# Patient Record
Sex: MALE | Race: WHITE | NOT HISPANIC OR LATINO | Employment: OTHER | URBAN - METROPOLITAN AREA
[De-identification: names, ages, dates, MRNs, and addresses within clinical notes are randomized per-mention and may not be internally consistent; named-entity substitution may affect disease eponyms.]

---

## 2017-01-09 ENCOUNTER — OFFICE VISIT - HEALTHEAST (OUTPATIENT)
Dept: FAMILY MEDICINE | Facility: CLINIC | Age: 82
End: 2017-01-09

## 2017-01-09 DIAGNOSIS — B07.9 WART: ICD-10-CM

## 2017-01-09 DIAGNOSIS — L57.0 ACTINIC KERATOSIS: ICD-10-CM

## 2017-01-09 DIAGNOSIS — M54.12 CERVICAL RADICULOPATHY: ICD-10-CM

## 2017-01-25 ENCOUNTER — RECORDS - HEALTHEAST (OUTPATIENT)
Dept: ADMINISTRATIVE | Facility: OTHER | Age: 82
End: 2017-01-25

## 2017-02-03 ENCOUNTER — COMMUNICATION - HEALTHEAST (OUTPATIENT)
Dept: SCHEDULING | Facility: CLINIC | Age: 82
End: 2017-02-03

## 2017-02-03 ENCOUNTER — OFFICE VISIT - HEALTHEAST (OUTPATIENT)
Dept: FAMILY MEDICINE | Facility: CLINIC | Age: 82
End: 2017-02-03

## 2017-02-03 ENCOUNTER — COMMUNICATION - HEALTHEAST (OUTPATIENT)
Dept: FAMILY MEDICINE | Facility: CLINIC | Age: 82
End: 2017-02-03

## 2017-02-03 ENCOUNTER — RECORDS - HEALTHEAST (OUTPATIENT)
Dept: GENERAL RADIOLOGY | Facility: CLINIC | Age: 82
End: 2017-02-03

## 2017-02-03 DIAGNOSIS — I10 HTN (HYPERTENSION): ICD-10-CM

## 2017-02-03 DIAGNOSIS — E53.8 OTHER B-COMPLEX DEFICIENCIES: ICD-10-CM

## 2017-02-03 DIAGNOSIS — E78.5 DYSLIPIDEMIA: ICD-10-CM

## 2017-02-03 DIAGNOSIS — E55.9 VITAMIN D DEFICIENCY: ICD-10-CM

## 2017-02-03 DIAGNOSIS — M54.12 RADICULOPATHY, CERVICAL REGION: ICD-10-CM

## 2017-02-03 DIAGNOSIS — H26.9 CATARACT: ICD-10-CM

## 2017-02-03 DIAGNOSIS — M54.12 CERVICAL RADICULOPATHY: ICD-10-CM

## 2017-02-03 DIAGNOSIS — I10 ESSENTIAL HYPERTENSION WITH GOAL BLOOD PRESSURE LESS THAN 140/90: ICD-10-CM

## 2017-02-03 DIAGNOSIS — I10 HYPERTENSION: ICD-10-CM

## 2017-02-03 DIAGNOSIS — N40.1 BENIGN NON-NODULAR PROSTATIC HYPERPLASIA WITH LOWER URINARY TRACT SYMPTOMS: ICD-10-CM

## 2017-02-03 DIAGNOSIS — Z12.5 SCREENING FOR PROSTATE CANCER: ICD-10-CM

## 2017-02-03 LAB
ATRIAL RATE - MUSE: 63 BPM
DIASTOLIC BLOOD PRESSURE - MUSE: NORMAL MMHG
INTERPRETATION ECG - MUSE: NORMAL
P AXIS - MUSE: 49 DEGREES
PR INTERVAL - MUSE: 160 MS
PSA SERPL-MCNC: 0.6 NG/ML (ref 0–6.5)
QRS DURATION - MUSE: 92 MS
QT - MUSE: 428 MS
QTC - MUSE: 437 MS
R AXIS - MUSE: -20 DEGREES
SYSTOLIC BLOOD PRESSURE - MUSE: NORMAL MMHG
T AXIS - MUSE: 39 DEGREES
VENTRICULAR RATE- MUSE: 63 BPM

## 2017-02-03 ASSESSMENT — MIFFLIN-ST. JEOR: SCORE: 1627.15

## 2017-02-06 ENCOUNTER — COMMUNICATION - HEALTHEAST (OUTPATIENT)
Dept: FAMILY MEDICINE | Facility: CLINIC | Age: 82
End: 2017-02-06

## 2017-02-07 ENCOUNTER — COMMUNICATION - HEALTHEAST (OUTPATIENT)
Dept: FAMILY MEDICINE | Facility: CLINIC | Age: 82
End: 2017-02-07

## 2017-02-15 ENCOUNTER — COMMUNICATION - HEALTHEAST (OUTPATIENT)
Dept: FAMILY MEDICINE | Facility: CLINIC | Age: 82
End: 2017-02-15

## 2017-03-03 ENCOUNTER — AMBULATORY - HEALTHEAST (OUTPATIENT)
Dept: FAMILY MEDICINE | Facility: CLINIC | Age: 82
End: 2017-03-03

## 2017-03-03 DIAGNOSIS — M54.12 CERVICAL RADICULOPATHY: ICD-10-CM

## 2017-03-06 ENCOUNTER — COMMUNICATION - HEALTHEAST (OUTPATIENT)
Dept: FAMILY MEDICINE | Facility: CLINIC | Age: 82
End: 2017-03-06

## 2017-03-08 ENCOUNTER — HOSPITAL ENCOUNTER (OUTPATIENT)
Dept: MRI IMAGING | Facility: HOSPITAL | Age: 82
Discharge: HOME | End: 2017-03-08
Attending: FAMILY MEDICINE

## 2017-03-08 DIAGNOSIS — M54.12 CERVICAL RADICULOPATHY: ICD-10-CM

## 2017-03-11 ENCOUNTER — COMMUNICATION - HEALTHEAST (OUTPATIENT)
Dept: FAMILY MEDICINE | Facility: CLINIC | Age: 82
End: 2017-03-11

## 2017-03-29 ENCOUNTER — COMMUNICATION - HEALTHEAST (OUTPATIENT)
Dept: FAMILY MEDICINE | Facility: CLINIC | Age: 82
End: 2017-03-29

## 2017-03-29 DIAGNOSIS — K21.9 GERD (GASTROESOPHAGEAL REFLUX DISEASE): ICD-10-CM

## 2017-04-05 ENCOUNTER — COMMUNICATION - HEALTHEAST (OUTPATIENT)
Dept: FAMILY MEDICINE | Facility: CLINIC | Age: 82
End: 2017-04-05

## 2017-04-05 DIAGNOSIS — K21.9 GERD (GASTROESOPHAGEAL REFLUX DISEASE): ICD-10-CM

## 2017-04-19 ENCOUNTER — COMMUNICATION - HEALTHEAST (OUTPATIENT)
Dept: FAMILY MEDICINE | Facility: CLINIC | Age: 82
End: 2017-04-19

## 2017-04-19 ENCOUNTER — AMBULATORY - HEALTHEAST (OUTPATIENT)
Dept: FAMILY MEDICINE | Facility: CLINIC | Age: 82
End: 2017-04-19

## 2017-04-19 DIAGNOSIS — K21.9 GERD (GASTROESOPHAGEAL REFLUX DISEASE): ICD-10-CM

## 2017-05-03 ENCOUNTER — COMMUNICATION - HEALTHEAST (OUTPATIENT)
Dept: FAMILY MEDICINE | Facility: CLINIC | Age: 82
End: 2017-05-03

## 2017-05-03 ENCOUNTER — AMBULATORY - HEALTHEAST (OUTPATIENT)
Dept: FAMILY MEDICINE | Facility: CLINIC | Age: 82
End: 2017-05-03

## 2017-05-03 DIAGNOSIS — M54.2 NECK PAIN: ICD-10-CM

## 2017-05-03 DIAGNOSIS — M25.519 SHOULDER PAIN: ICD-10-CM

## 2017-05-15 ENCOUNTER — HOSPITAL ENCOUNTER (OUTPATIENT)
Dept: PHYSICAL MEDICINE AND REHAB | Facility: CLINIC | Age: 82
Discharge: HOME | End: 2017-05-15
Attending: FAMILY MEDICINE

## 2017-05-15 ENCOUNTER — COMMUNICATION - HEALTHEAST (OUTPATIENT)
Dept: PHYSICAL MEDICINE AND REHAB | Facility: CLINIC | Age: 82
End: 2017-05-15

## 2017-05-15 DIAGNOSIS — S32.010A COMPRESSION FRACTURE OF L1 LUMBAR VERTEBRA (H): ICD-10-CM

## 2017-05-15 DIAGNOSIS — M79.18 MYOFASCIAL MUSCLE PAIN: ICD-10-CM

## 2017-05-15 DIAGNOSIS — M48.02 NEURAL FORAMINAL STENOSIS OF CERVICAL SPINE: ICD-10-CM

## 2017-05-15 DIAGNOSIS — M54.12 CERVICAL RADICULAR PAIN: ICD-10-CM

## 2017-05-15 ASSESSMENT — MIFFLIN-ST. JEOR: SCORE: 1618.08

## 2017-05-23 ENCOUNTER — RECORDS - HEALTHEAST (OUTPATIENT)
Dept: ADMINISTRATIVE | Facility: OTHER | Age: 82
End: 2017-05-23

## 2017-09-05 ENCOUNTER — RECORDS - HEALTHEAST (OUTPATIENT)
Dept: GENERAL RADIOLOGY | Facility: CLINIC | Age: 82
End: 2017-09-05

## 2017-09-05 ENCOUNTER — OFFICE VISIT - HEALTHEAST (OUTPATIENT)
Dept: FAMILY MEDICINE | Facility: CLINIC | Age: 82
End: 2017-09-05

## 2017-09-05 DIAGNOSIS — R10.9 ABDOMINAL PRESSURE: ICD-10-CM

## 2017-09-05 DIAGNOSIS — R10.9 UNSPECIFIED ABDOMINAL PAIN: ICD-10-CM

## 2017-09-05 DIAGNOSIS — D48.5 NEOPLASM OF UNCERTAIN BEHAVIOR OF SKIN: ICD-10-CM

## 2017-09-05 DIAGNOSIS — R14.0 ABDOMINAL DISTENSION (GASEOUS): ICD-10-CM

## 2017-09-05 DIAGNOSIS — R14.0 FLATULENCE/GAS PAIN/BELCHING: ICD-10-CM

## 2017-09-06 ENCOUNTER — HOSPITAL ENCOUNTER (OUTPATIENT)
Dept: CT IMAGING | Facility: HOSPITAL | Age: 82
Discharge: HOME | End: 2017-09-06
Attending: FAMILY MEDICINE

## 2017-09-06 DIAGNOSIS — R10.9 ABDOMINAL PRESSURE: ICD-10-CM

## 2017-09-06 DIAGNOSIS — R14.0 FLATULENCE/GAS PAIN/BELCHING: ICD-10-CM

## 2017-09-07 ENCOUNTER — COMMUNICATION - HEALTHEAST (OUTPATIENT)
Dept: FAMILY MEDICINE | Facility: CLINIC | Age: 82
End: 2017-09-07

## 2017-09-08 ENCOUNTER — COMMUNICATION - HEALTHEAST (OUTPATIENT)
Dept: FAMILY MEDICINE | Facility: CLINIC | Age: 82
End: 2017-09-08

## 2017-09-08 DIAGNOSIS — I10 HYPERTENSION: ICD-10-CM

## 2017-09-11 LAB
ATRIAL RATE - MUSE: 52 BPM
DIASTOLIC BLOOD PRESSURE - MUSE: NORMAL MMHG
INTERPRETATION ECG - MUSE: NORMAL
P AXIS - MUSE: 47 DEGREES
PR INTERVAL - MUSE: 148 MS
QRS DURATION - MUSE: 88 MS
QT - MUSE: 456 MS
QTC - MUSE: 424 MS
R AXIS - MUSE: -16 DEGREES
SYSTOLIC BLOOD PRESSURE - MUSE: NORMAL MMHG
T AXIS - MUSE: 37 DEGREES
VENTRICULAR RATE- MUSE: 52 BPM

## 2017-09-15 ENCOUNTER — COMMUNICATION - HEALTHEAST (OUTPATIENT)
Dept: FAMILY MEDICINE | Facility: CLINIC | Age: 82
End: 2017-09-15

## 2017-10-02 ENCOUNTER — RECORDS - HEALTHEAST (OUTPATIENT)
Dept: ADMINISTRATIVE | Facility: OTHER | Age: 82
End: 2017-10-02

## 2017-10-16 ENCOUNTER — COMMUNICATION - HEALTHEAST (OUTPATIENT)
Dept: FAMILY MEDICINE | Facility: CLINIC | Age: 82
End: 2017-10-16

## 2017-10-23 ENCOUNTER — RECORDS - HEALTHEAST (OUTPATIENT)
Dept: ADMINISTRATIVE | Facility: OTHER | Age: 82
End: 2017-10-23

## 2017-10-26 ENCOUNTER — RECORDS - HEALTHEAST (OUTPATIENT)
Dept: ADMINISTRATIVE | Facility: OTHER | Age: 82
End: 2017-10-26

## 2017-12-15 ENCOUNTER — AMBULATORY - HEALTHEAST (OUTPATIENT)
Dept: NURSING | Facility: CLINIC | Age: 82
End: 2017-12-15

## 2017-12-15 DIAGNOSIS — Z00.00 PREVENTATIVE HEALTH CARE: ICD-10-CM

## 2018-02-01 ENCOUNTER — COMMUNICATION - HEALTHEAST (OUTPATIENT)
Dept: FAMILY MEDICINE | Facility: CLINIC | Age: 83
End: 2018-02-01

## 2018-02-01 DIAGNOSIS — I10 HTN (HYPERTENSION): ICD-10-CM

## 2018-02-01 DIAGNOSIS — N40.0 BPH (BENIGN PROSTATIC HYPERPLASIA): ICD-10-CM

## 2018-02-06 ENCOUNTER — OFFICE VISIT - HEALTHEAST (OUTPATIENT)
Dept: FAMILY MEDICINE | Facility: CLINIC | Age: 83
End: 2018-02-06

## 2018-02-06 DIAGNOSIS — E78.5 DYSLIPIDEMIA: ICD-10-CM

## 2018-02-06 DIAGNOSIS — K21.9 GERD (GASTROESOPHAGEAL REFLUX DISEASE): ICD-10-CM

## 2018-02-06 DIAGNOSIS — Z12.5 SCREENING FOR PROSTATE CANCER: ICD-10-CM

## 2018-02-06 DIAGNOSIS — K14.3 COATED TONGUE: ICD-10-CM

## 2018-02-06 DIAGNOSIS — E53.8 VITAMIN B12 DEFICIENCY: ICD-10-CM

## 2018-02-06 DIAGNOSIS — I10 ESSENTIAL HYPERTENSION: ICD-10-CM

## 2018-02-06 DIAGNOSIS — E55.9 VITAMIN D DEFICIENCY: ICD-10-CM

## 2018-02-06 DIAGNOSIS — Z00.00 WELL ADULT EXAM: ICD-10-CM

## 2018-02-06 DIAGNOSIS — N40.0 BPH (BENIGN PROSTATIC HYPERPLASIA): ICD-10-CM

## 2018-02-06 DIAGNOSIS — R79.89 OTHER SPECIFIED ABNORMAL FINDINGS OF BLOOD CHEMISTRY: ICD-10-CM

## 2018-02-06 DIAGNOSIS — R14.0 BLOATING: ICD-10-CM

## 2018-02-06 LAB
ALBUMIN SERPL-MCNC: 4.1 G/DL (ref 3.5–5)
ALBUMIN UR-MCNC: NEGATIVE MG/DL
ALP SERPL-CCNC: 79 U/L (ref 45–120)
ALT SERPL W P-5'-P-CCNC: 64 U/L (ref 0–45)
ANION GAP SERPL CALCULATED.3IONS-SCNC: 11 MMOL/L (ref 5–18)
APPEARANCE UR: CLEAR
AST SERPL W P-5'-P-CCNC: 71 U/L (ref 0–40)
BASOPHILS # BLD AUTO: 0.1 THOU/UL (ref 0–0.2)
BASOPHILS NFR BLD AUTO: 1 % (ref 0–2)
BILIRUB SERPL-MCNC: 1.5 MG/DL (ref 0–1)
BILIRUB UR QL STRIP: NEGATIVE
BUN SERPL-MCNC: 16 MG/DL (ref 8–28)
CALCIUM SERPL-MCNC: 9.3 MG/DL (ref 8.5–10.5)
CHLORIDE BLD-SCNC: 104 MMOL/L (ref 98–107)
CHOLEST SERPL-MCNC: 213 MG/DL
CO2 SERPL-SCNC: 25 MMOL/L (ref 22–31)
COLOR UR AUTO: YELLOW
CREAT SERPL-MCNC: 1.06 MG/DL (ref 0.7–1.3)
CRP SERPL HS-MCNC: 5.3 MG/L (ref 0–3)
EOSINOPHIL # BLD AUTO: 0.2 THOU/UL (ref 0–0.4)
EOSINOPHIL NFR BLD AUTO: 3 % (ref 0–6)
ERYTHROCYTE [DISTWIDTH] IN BLOOD BY AUTOMATED COUNT: 12.3 % (ref 11–14.5)
FASTING STATUS PATIENT QL REPORTED: YES
GFR SERPL CREATININE-BSD FRML MDRD: >60 ML/MIN/1.73M2
GLUCOSE BLD-MCNC: 115 MG/DL (ref 70–125)
GLUCOSE UR STRIP-MCNC: NEGATIVE MG/DL
HCT VFR BLD AUTO: 51.3 % (ref 40–54)
HDLC SERPL-MCNC: 50 MG/DL
HGB BLD-MCNC: 17.2 G/DL (ref 14–18)
HGB UR QL STRIP: NEGATIVE
KETONES UR STRIP-MCNC: NEGATIVE MG/DL
LDLC SERPL CALC-MCNC: 138 MG/DL
LEUKOCYTE ESTERASE UR QL STRIP: NEGATIVE
LYMPHOCYTES # BLD AUTO: 1.4 THOU/UL (ref 0.8–4.4)
LYMPHOCYTES NFR BLD AUTO: 17 % (ref 20–40)
MCH RBC QN AUTO: 30.5 PG (ref 27–34)
MCHC RBC AUTO-ENTMCNC: 33.5 G/DL (ref 32–36)
MCV RBC AUTO: 91 FL (ref 80–100)
MONOCYTES # BLD AUTO: 0.6 THOU/UL (ref 0–0.9)
MONOCYTES NFR BLD AUTO: 8 % (ref 2–10)
NEUTROPHILS # BLD AUTO: 5.6 THOU/UL (ref 2–7.7)
NEUTROPHILS NFR BLD AUTO: 71 % (ref 50–70)
NITRATE UR QL: NEGATIVE
PH UR STRIP: 6.5 [PH] (ref 5–8)
PLATELET # BLD AUTO: 200 THOU/UL (ref 140–440)
PMV BLD AUTO: 10.9 FL (ref 8.5–12.5)
POTASSIUM BLD-SCNC: 4.4 MMOL/L (ref 3.5–5)
PROT SERPL-MCNC: 7.4 G/DL (ref 6–8)
PSA SERPL-MCNC: 0.5 NG/ML (ref 0–6.5)
RBC # BLD AUTO: 5.64 MILL/UL (ref 4.4–6.2)
SODIUM SERPL-SCNC: 140 MMOL/L (ref 136–145)
SP GR UR STRIP: 1.01 (ref 1–1.03)
TRIGL SERPL-MCNC: 126 MG/DL
TSH SERPL DL<=0.005 MIU/L-ACNC: 2.34 UIU/ML (ref 0.3–5)
UROBILINOGEN UR STRIP-ACNC: NORMAL
VIT B12 SERPL-MCNC: 521 PG/ML (ref 213–816)
WBC: 7.9 THOU/UL (ref 4–11)

## 2018-02-06 RX ORDER — DUTASTERIDE 0.5 MG/1
CAPSULE, LIQUID FILLED ORAL
Qty: 90 CAPSULE | Refills: 3 | Status: SHIPPED | OUTPATIENT
Start: 2018-02-06

## 2018-02-06 ASSESSMENT — MIFFLIN-ST. JEOR: SCORE: 1633.96

## 2018-02-07 LAB — 25(OH)D3 SERPL-MCNC: 17.6 NG/ML (ref 30–80)

## 2018-02-12 LAB — BACTERIA SPEC CULT: NORMAL

## 2018-02-14 ENCOUNTER — COMMUNICATION - HEALTHEAST (OUTPATIENT)
Dept: FAMILY MEDICINE | Facility: CLINIC | Age: 83
End: 2018-02-14

## 2018-02-27 ENCOUNTER — COMMUNICATION - HEALTHEAST (OUTPATIENT)
Dept: FAMILY MEDICINE | Facility: CLINIC | Age: 83
End: 2018-02-27

## 2018-03-08 ENCOUNTER — AMBULATORY - HEALTHEAST (OUTPATIENT)
Dept: FAMILY MEDICINE | Facility: CLINIC | Age: 83
End: 2018-03-08

## 2018-03-08 ENCOUNTER — COMMUNICATION - HEALTHEAST (OUTPATIENT)
Dept: FAMILY MEDICINE | Facility: CLINIC | Age: 83
End: 2018-03-08

## 2018-03-08 DIAGNOSIS — J98.6 ELEVATED DIAPHRAGM: ICD-10-CM

## 2018-03-08 DIAGNOSIS — R10.13 EPIGASTRIC PAIN: ICD-10-CM

## 2018-03-16 ENCOUNTER — COMMUNICATION - HEALTHEAST (OUTPATIENT)
Dept: FAMILY MEDICINE | Facility: CLINIC | Age: 83
End: 2018-03-16

## 2018-03-16 DIAGNOSIS — K21.9 GERD (GASTROESOPHAGEAL REFLUX DISEASE): ICD-10-CM

## 2018-04-25 ENCOUNTER — COMMUNICATION - HEALTHEAST (OUTPATIENT)
Dept: FAMILY MEDICINE | Facility: CLINIC | Age: 83
End: 2018-04-25

## 2018-04-25 DIAGNOSIS — D12.6 BENIGN NEOPLASM OF COLON: ICD-10-CM

## 2018-05-16 ENCOUNTER — RECORDS - HEALTHEAST (OUTPATIENT)
Dept: ADMINISTRATIVE | Facility: OTHER | Age: 83
End: 2018-05-16

## 2018-05-31 ENCOUNTER — COMMUNICATION - HEALTHEAST (OUTPATIENT)
Dept: SCHEDULING | Facility: CLINIC | Age: 83
End: 2018-05-31

## 2018-09-07 ENCOUNTER — COMMUNICATION - HEALTHEAST (OUTPATIENT)
Dept: FAMILY MEDICINE | Facility: CLINIC | Age: 83
End: 2018-09-07

## 2018-09-07 DIAGNOSIS — I10 HYPERTENSION: ICD-10-CM

## 2018-09-20 ENCOUNTER — RECORDS - HEALTHEAST (OUTPATIENT)
Dept: ADMINISTRATIVE | Facility: OTHER | Age: 83
End: 2018-09-20

## 2018-11-13 ENCOUNTER — OFFICE VISIT - HEALTHEAST (OUTPATIENT)
Dept: FAMILY MEDICINE | Facility: CLINIC | Age: 83
End: 2018-11-13

## 2018-11-13 DIAGNOSIS — R14.3 FLATULENCE, ERUCTATION AND GAS PAIN: ICD-10-CM

## 2018-11-13 DIAGNOSIS — R19.4 BOWEL HABIT CHANGES: ICD-10-CM

## 2018-11-13 DIAGNOSIS — R14.1 FLATULENCE, ERUCTATION AND GAS PAIN: ICD-10-CM

## 2018-11-13 DIAGNOSIS — R14.2 FLATULENCE, ERUCTATION AND GAS PAIN: ICD-10-CM

## 2018-11-13 DIAGNOSIS — K21.9 GASTROESOPHAGEAL REFLUX DISEASE WITHOUT ESOPHAGITIS: ICD-10-CM

## 2018-11-13 DIAGNOSIS — R14.0 BLOATING SYMPTOM: ICD-10-CM

## 2018-12-06 ENCOUNTER — COMMUNICATION - HEALTHEAST (OUTPATIENT)
Dept: FAMILY MEDICINE | Facility: CLINIC | Age: 83
End: 2018-12-06

## 2018-12-06 DIAGNOSIS — I10 HYPERTENSION: ICD-10-CM

## 2019-03-04 ENCOUNTER — AMBULATORY - HEALTHEAST (OUTPATIENT)
Dept: LAB | Facility: CLINIC | Age: 84
End: 2019-03-04

## 2019-03-04 DIAGNOSIS — Z12.5 ENCOUNTER FOR SCREENING FOR MALIGNANT NEOPLASM OF PROSTATE: ICD-10-CM

## 2019-03-25 ENCOUNTER — AMBULATORY - HEALTHEAST (OUTPATIENT)
Dept: LAB | Facility: CLINIC | Age: 84
End: 2019-03-25

## 2019-03-25 DIAGNOSIS — Z12.5 ENCOUNTER FOR SCREENING FOR MALIGNANT NEOPLASM OF PROSTATE: ICD-10-CM

## 2019-04-04 ENCOUNTER — OFFICE VISIT - HEALTHEAST (OUTPATIENT)
Dept: FAMILY MEDICINE | Facility: CLINIC | Age: 84
End: 2019-04-04

## 2019-04-04 DIAGNOSIS — L57.0 ACTINIC KERATOSIS: ICD-10-CM

## 2019-04-04 DIAGNOSIS — K21.9 GASTROESOPHAGEAL REFLUX DISEASE WITHOUT ESOPHAGITIS: ICD-10-CM

## 2019-04-04 DIAGNOSIS — E53.8 VITAMIN B12 DEFICIENCY: ICD-10-CM

## 2019-04-04 DIAGNOSIS — Z00.00 WELL ADULT EXAM: ICD-10-CM

## 2019-04-04 DIAGNOSIS — R79.89 OTHER SPECIFIED ABNORMAL FINDINGS OF BLOOD CHEMISTRY: ICD-10-CM

## 2019-04-04 DIAGNOSIS — N40.1 BENIGN NON-NODULAR PROSTATIC HYPERPLASIA WITH LOWER URINARY TRACT SYMPTOMS: ICD-10-CM

## 2019-04-04 DIAGNOSIS — E55.9 VITAMIN D DEFICIENCY: ICD-10-CM

## 2019-04-04 DIAGNOSIS — H53.8 BLURRY VISION, LEFT EYE: ICD-10-CM

## 2019-04-04 LAB
ALBUMIN SERPL-MCNC: 4 G/DL (ref 3.5–5)
ALP SERPL-CCNC: 64 U/L (ref 45–120)
ALT SERPL W P-5'-P-CCNC: 56 U/L (ref 0–45)
ANION GAP SERPL CALCULATED.3IONS-SCNC: 11 MMOL/L (ref 5–18)
AST SERPL W P-5'-P-CCNC: 66 U/L (ref 0–40)
BASOPHILS # BLD AUTO: 0.1 THOU/UL (ref 0–0.2)
BASOPHILS NFR BLD AUTO: 1 % (ref 0–2)
BILIRUB SERPL-MCNC: 1.3 MG/DL (ref 0–1)
BUN SERPL-MCNC: 17 MG/DL (ref 8–28)
CALCIUM SERPL-MCNC: 9.5 MG/DL (ref 8.5–10.5)
CHLORIDE BLD-SCNC: 104 MMOL/L (ref 98–107)
CHOLEST SERPL-MCNC: 201 MG/DL
CO2 SERPL-SCNC: 25 MMOL/L (ref 22–31)
CREAT SERPL-MCNC: 1.02 MG/DL (ref 0.7–1.3)
EOSINOPHIL # BLD AUTO: 0.3 THOU/UL (ref 0–0.4)
EOSINOPHIL NFR BLD AUTO: 4 % (ref 0–6)
ERYTHROCYTE [DISTWIDTH] IN BLOOD BY AUTOMATED COUNT: 11.8 % (ref 11–14.5)
FASTING STATUS PATIENT QL REPORTED: ABNORMAL
GFR SERPL CREATININE-BSD FRML MDRD: >60 ML/MIN/1.73M2
GLUCOSE BLD-MCNC: 105 MG/DL (ref 70–125)
HCT VFR BLD AUTO: 49 % (ref 40–54)
HDLC SERPL-MCNC: 51 MG/DL
HGB BLD-MCNC: 16.5 G/DL (ref 14–18)
LDLC SERPL CALC-MCNC: 126 MG/DL
LYMPHOCYTES # BLD AUTO: 1.4 THOU/UL (ref 0.8–4.4)
LYMPHOCYTES NFR BLD AUTO: 21 % (ref 20–40)
MCH RBC QN AUTO: 30.3 PG (ref 27–34)
MCHC RBC AUTO-ENTMCNC: 33.7 G/DL (ref 32–36)
MCV RBC AUTO: 90 FL (ref 80–100)
MONOCYTES # BLD AUTO: 0.4 THOU/UL (ref 0–0.9)
MONOCYTES NFR BLD AUTO: 6 % (ref 2–10)
NEUTROPHILS # BLD AUTO: 4.5 THOU/UL (ref 2–7.7)
NEUTROPHILS NFR BLD AUTO: 69 % (ref 50–70)
PLATELET # BLD AUTO: 167 THOU/UL (ref 140–440)
PMV BLD AUTO: 7.8 FL (ref 7–10)
POTASSIUM BLD-SCNC: 4.1 MMOL/L (ref 3.5–5)
PROT SERPL-MCNC: 7.1 G/DL (ref 6–8)
RBC # BLD AUTO: 5.44 MILL/UL (ref 4.4–6.2)
SODIUM SERPL-SCNC: 140 MMOL/L (ref 136–145)
TRIGL SERPL-MCNC: 118 MG/DL
TSH SERPL DL<=0.005 MIU/L-ACNC: 1.48 UIU/ML (ref 0.3–5)
VIT B12 SERPL-MCNC: 388 PG/ML (ref 213–816)
WBC: 6.6 THOU/UL (ref 4–11)

## 2019-04-04 ASSESSMENT — MIFFLIN-ST. JEOR: SCORE: 1608.44

## 2019-04-10 LAB
MAGNESIUM,RBC - HISTORICAL: 4.7 MG/DL (ref 3.5–7.1)
SPECIMEN STATUS: NORMAL

## 2019-04-12 ENCOUNTER — COMMUNICATION - HEALTHEAST (OUTPATIENT)
Dept: FAMILY MEDICINE | Facility: CLINIC | Age: 84
End: 2019-04-12

## 2019-04-22 ENCOUNTER — RECORDS - HEALTHEAST (OUTPATIENT)
Dept: ADMINISTRATIVE | Facility: OTHER | Age: 84
End: 2019-04-22

## 2019-04-22 ENCOUNTER — AMBULATORY - HEALTHEAST (OUTPATIENT)
Dept: LAB | Facility: CLINIC | Age: 84
End: 2019-04-22

## 2019-04-22 DIAGNOSIS — Z12.5 ENCOUNTER FOR SCREENING FOR MALIGNANT NEOPLASM OF PROSTATE: ICD-10-CM

## 2019-04-22 LAB — PSA SERPL-MCNC: 0.5 NG/ML (ref 0–6.5)

## 2019-05-14 ENCOUNTER — COMMUNICATION - HEALTHEAST (OUTPATIENT)
Dept: FAMILY MEDICINE | Facility: CLINIC | Age: 84
End: 2019-05-14

## 2019-05-21 ENCOUNTER — COMMUNICATION - HEALTHEAST (OUTPATIENT)
Dept: FAMILY MEDICINE | Facility: CLINIC | Age: 84
End: 2019-05-21

## 2019-05-21 DIAGNOSIS — K21.9 GERD (GASTROESOPHAGEAL REFLUX DISEASE): ICD-10-CM

## 2019-08-02 ENCOUNTER — RECORDS - HEALTHEAST (OUTPATIENT)
Dept: ADMINISTRATIVE | Facility: OTHER | Age: 84
End: 2019-08-02

## 2019-09-05 ENCOUNTER — RECORDS - HEALTHEAST (OUTPATIENT)
Dept: ADMINISTRATIVE | Facility: OTHER | Age: 84
End: 2019-09-05

## 2019-09-20 ENCOUNTER — RECORDS - HEALTHEAST (OUTPATIENT)
Dept: ADMINISTRATIVE | Facility: OTHER | Age: 84
End: 2019-09-20

## 2019-10-14 ENCOUNTER — RECORDS - HEALTHEAST (OUTPATIENT)
Dept: ADMINISTRATIVE | Facility: OTHER | Age: 84
End: 2019-10-14

## 2019-11-30 ENCOUNTER — COMMUNICATION - HEALTHEAST (OUTPATIENT)
Dept: FAMILY MEDICINE | Facility: CLINIC | Age: 84
End: 2019-11-30

## 2019-11-30 DIAGNOSIS — I10 HYPERTENSION: ICD-10-CM

## 2019-12-05 ENCOUNTER — RECORDS - HEALTHEAST (OUTPATIENT)
Dept: ADMINISTRATIVE | Facility: OTHER | Age: 84
End: 2019-12-05

## 2020-02-27 ENCOUNTER — COMMUNICATION - HEALTHEAST (OUTPATIENT)
Dept: FAMILY MEDICINE | Facility: CLINIC | Age: 85
End: 2020-02-27

## 2020-02-27 DIAGNOSIS — I10 HYPERTENSION: ICD-10-CM

## 2020-05-28 ENCOUNTER — COMMUNICATION - HEALTHEAST (OUTPATIENT)
Dept: FAMILY MEDICINE | Facility: CLINIC | Age: 85
End: 2020-05-28

## 2020-05-28 DIAGNOSIS — I10 HYPERTENSION: ICD-10-CM

## 2020-07-20 ENCOUNTER — OFFICE VISIT - HEALTHEAST (OUTPATIENT)
Dept: FAMILY MEDICINE | Facility: CLINIC | Age: 85
End: 2020-07-20

## 2020-07-20 DIAGNOSIS — E55.9 VITAMIN D DEFICIENCY: ICD-10-CM

## 2020-07-20 DIAGNOSIS — K21.9 GERD (GASTROESOPHAGEAL REFLUX DISEASE): ICD-10-CM

## 2020-07-20 DIAGNOSIS — B07.8 OTHER VIRAL WARTS: ICD-10-CM

## 2020-07-20 DIAGNOSIS — N40.1 BENIGN NON-NODULAR PROSTATIC HYPERPLASIA WITH LOWER URINARY TRACT SYMPTOMS: ICD-10-CM

## 2020-07-20 DIAGNOSIS — I10 HYPERTENSION: ICD-10-CM

## 2020-07-20 DIAGNOSIS — I10 ESSENTIAL HYPERTENSION: ICD-10-CM

## 2020-07-20 DIAGNOSIS — E53.8 VITAMIN B12 DEFICIENCY: ICD-10-CM

## 2020-07-20 DIAGNOSIS — Z12.5 SCREENING FOR PROSTATE CANCER: ICD-10-CM

## 2020-07-20 DIAGNOSIS — E78.5 DYSLIPIDEMIA: ICD-10-CM

## 2020-07-20 LAB
ALBUMIN SERPL-MCNC: 4.2 G/DL (ref 3.5–5)
ALBUMIN UR-MCNC: NEGATIVE MG/DL
ALP SERPL-CCNC: 71 U/L (ref 45–120)
ALT SERPL W P-5'-P-CCNC: 46 U/L (ref 0–45)
ANION GAP SERPL CALCULATED.3IONS-SCNC: 12 MMOL/L (ref 5–18)
APPEARANCE UR: CLEAR
AST SERPL W P-5'-P-CCNC: 56 U/L (ref 0–40)
BACTERIA #/AREA URNS HPF: ABNORMAL HPF
BASOPHILS # BLD AUTO: 0 THOU/UL (ref 0–0.2)
BASOPHILS NFR BLD AUTO: 1 % (ref 0–2)
BILIRUB SERPL-MCNC: 1.3 MG/DL (ref 0–1)
BILIRUB UR QL STRIP: NEGATIVE
BUN SERPL-MCNC: 15 MG/DL (ref 8–28)
CALCIUM SERPL-MCNC: 9.3 MG/DL (ref 8.5–10.5)
CHLORIDE BLD-SCNC: 104 MMOL/L (ref 98–107)
CHOLEST SERPL-MCNC: 213 MG/DL
CO2 SERPL-SCNC: 23 MMOL/L (ref 22–31)
COLOR UR AUTO: YELLOW
CREAT SERPL-MCNC: 1.02 MG/DL (ref 0.7–1.3)
EOSINOPHIL # BLD AUTO: 0.4 THOU/UL (ref 0–0.4)
EOSINOPHIL NFR BLD AUTO: 5 % (ref 0–6)
ERYTHROCYTE [DISTWIDTH] IN BLOOD BY AUTOMATED COUNT: 11.9 % (ref 11–14.5)
FASTING STATUS PATIENT QL REPORTED: YES
GFR SERPL CREATININE-BSD FRML MDRD: >60 ML/MIN/1.73M2
GLUCOSE BLD-MCNC: 102 MG/DL (ref 70–125)
GLUCOSE UR STRIP-MCNC: NEGATIVE MG/DL
HCT VFR BLD AUTO: 46.7 % (ref 40–54)
HDLC SERPL-MCNC: 53 MG/DL
HGB BLD-MCNC: 16.3 G/DL (ref 14–18)
HGB UR QL STRIP: ABNORMAL
KETONES UR STRIP-MCNC: NEGATIVE MG/DL
LDLC SERPL CALC-MCNC: 136 MG/DL
LEUKOCYTE ESTERASE UR QL STRIP: ABNORMAL
LYMPHOCYTES # BLD AUTO: 1.5 THOU/UL (ref 0.8–4.4)
LYMPHOCYTES NFR BLD AUTO: 22 % (ref 20–40)
MCH RBC QN AUTO: 30.4 PG (ref 27–34)
MCHC RBC AUTO-ENTMCNC: 34.9 G/DL (ref 32–36)
MCV RBC AUTO: 87 FL (ref 80–100)
MONOCYTES # BLD AUTO: 0.6 THOU/UL (ref 0–0.9)
MONOCYTES NFR BLD AUTO: 8 % (ref 2–10)
NEUTROPHILS # BLD AUTO: 4.6 THOU/UL (ref 2–7.7)
NEUTROPHILS NFR BLD AUTO: 65 % (ref 50–70)
NITRATE UR QL: NEGATIVE
PH UR STRIP: 5.5 [PH] (ref 5–8)
PLATELET # BLD AUTO: 160 THOU/UL (ref 140–440)
PMV BLD AUTO: 8.1 FL (ref 7–10)
POTASSIUM BLD-SCNC: 4.2 MMOL/L (ref 3.5–5)
PROT SERPL-MCNC: 7.4 G/DL (ref 6–8)
PSA SERPL-MCNC: 0.7 NG/ML (ref 0–6.5)
RBC # BLD AUTO: 5.35 MILL/UL (ref 4.4–6.2)
RBC #/AREA URNS AUTO: ABNORMAL HPF
SODIUM SERPL-SCNC: 139 MMOL/L (ref 136–145)
SP GR UR STRIP: 1.02 (ref 1–1.03)
SQUAMOUS #/AREA URNS AUTO: ABNORMAL LPF
TRIGL SERPL-MCNC: 122 MG/DL
TSH SERPL DL<=0.005 MIU/L-ACNC: 1.96 UIU/ML (ref 0.3–5)
UROBILINOGEN UR STRIP-ACNC: ABNORMAL
VIT B12 SERPL-MCNC: 506 PG/ML (ref 213–816)
WBC #/AREA URNS AUTO: ABNORMAL HPF
WBC: 7.2 THOU/UL (ref 4–11)

## 2020-07-20 ASSESSMENT — MIFFLIN-ST. JEOR: SCORE: 1577.82

## 2020-07-20 NOTE — ASSESSMENT & PLAN NOTE
Dutasteride (Avodart)   Had Urine Retention 270 ml  Went back to 120 ml retention  No bathroom at night

## 2020-07-21 ENCOUNTER — COMMUNICATION - HEALTHEAST (OUTPATIENT)
Dept: FAMILY MEDICINE | Facility: CLINIC | Age: 85
End: 2020-07-21

## 2020-07-21 LAB
25(OH)D3 SERPL-MCNC: 24.8 NG/ML (ref 30–80)
BACTERIA SPEC CULT: NO GROWTH

## 2020-08-28 ENCOUNTER — COMMUNICATION - HEALTHEAST (OUTPATIENT)
Dept: FAMILY MEDICINE | Facility: CLINIC | Age: 85
End: 2020-08-28

## 2020-08-28 DIAGNOSIS — I10 HYPERTENSION: ICD-10-CM

## 2020-08-31 RX ORDER — AMLODIPINE BESYLATE 5 MG/1
TABLET ORAL
Qty: 90 TABLET | Refills: 3 | Status: SHIPPED | OUTPATIENT
Start: 2020-08-31 | End: 2021-08-23

## 2021-01-14 ENCOUNTER — RECORDS - HEALTHEAST (OUTPATIENT)
Dept: ADMINISTRATIVE | Facility: OTHER | Age: 86
End: 2021-01-14

## 2021-03-31 ENCOUNTER — RECORDS - HEALTHEAST (OUTPATIENT)
Dept: ADMINISTRATIVE | Facility: OTHER | Age: 86
End: 2021-03-31

## 2021-05-27 ENCOUNTER — RECORDS - HEALTHEAST (OUTPATIENT)
Dept: ADMINISTRATIVE | Facility: CLINIC | Age: 86
End: 2021-05-27

## 2021-05-27 NOTE — PROGRESS NOTES
"Preoperative Exam    Scheduled Procedure: YAG  Laser Capsulotomy   Surgery Date: 4/8/2019   Surgery Location: [facility]   CHRISTIANO Fitzgerald   Surgeon:  Eye Consultant    Assessment/Plan:     Problem List Items Addressed This Visit     Vitamin D deficiency - Primary    Vitamin B12 deficiency    Relevant Orders    Vitamin B12    GERD (gastroesophageal reflux disease)    Benign non-nodular prostatic hyperplasia with lower urinary tract symptoms    Actinic Keratosis    Abnormal Blood Chemistry    Relevant Orders    Comprehensive Metabolic Panel    Magnesium, Red Blood Cell    HM1(CBC and Differential)    Lipid Cascade    Thyroid [place]    HM1 (CBC with Diff)      Other Visit Diagnoses     Blurry vision, left eye        Well adult exam        Relevant Orders    Comprehensive Metabolic Panel    Vitamin B12    Magnesium, Red Blood Cell    HM1(CBC and Differential)    Lipid Cascade    Thyroid [place]    HM1 (CBC with Diff)            Surgical Procedure Risk: Low (reported cardiac risk generally < 1%)  Have you had prior anesthesia?: Yes  Have you or any family members had a previous anesthesia reaction:  No  Do you or any family members have a history of a clotting or bleeding disorder?: No  Cardiac Risk Assessment: no increased risk for major cardiac complications    Patient approved for surgery with general or local anesthesia.   Patient may continue all of his medications  Inform the surgical team if something should change in his clinical condition  Please Note:  none    Functional Status: Independent  Patient plans to recover at home with family.     Subjective:      Foster Enriquez is a 83 y.o. male who presents for a preoperative consultation.  Surgery 2017   Since developed Eye Left Blurry   \"Skin grows over the eye\"  Needs laser       All other systems reviewed and are negative, other than those listed in the HPI.    Pertinent History  Do you have difficulty breathing or chest pain after walking up a " flight of stairs: No  History of obstructive sleep apnea: No  Steroid use in the last 6 months: No  Frequent Aspirin/NSAID use: Yes: baby aspirin daily  Prior Blood Transfusion: No  Prior Blood Transfusion Reaction: No  If for some reason prior to, during or after the procedure, if it is medically indicated, would you be willing to have a blood transfusion?:  There is no transfusion refusal.    Current Outpatient Medications   Medication Sig Dispense Refill     amLODIPine (NORVASC) 5 MG tablet TAKE 1 TABLET(5 MG) BY MOUTH DAILY 90 tablet 3     aspirin 81 MG EC tablet Take 81 mg by mouth daily.       cholecalciferol, vitamin D3, 1,000 unit tablet Take 1,000 Units by mouth daily.       dutasteride (AVODART) 0.5 mg capsule TAKE 1 CAPSULE BY MOUTH DAILY 90 capsule 3     hydrocortisone (ANUSOL-HC) 2.5 % rectal cream Insert into the rectum 2 (two) times a day. 30 g 3     omeprazole (PRILOSEC) 20 MG capsule TAKE 1 CAPSULE BY MOUTH EVERY DAY 90 capsule 2     No current facility-administered medications for this visit.         Allergies   Allergen Reactions     Latex      Nitroglycerin      Annotation: Severe reaction- hypotension       Sulfa (Sulfonamide Antibiotics)      Ciprofloxacin Rash       Patient Active Problem List   Diagnosis     Cataract     Vitamin B12 deficiency     Dyslipidemia     Benign non-nodular prostatic hyperplasia with lower urinary tract symptoms     Actinic Keratosis     Abnormal Blood Chemistry     Benign Adenomatous Polyp Of The Large Intestine     Benign Hypertensive Heart Disease With Congestive Heart Failure     Essential Hypertension     Idiopathic Urticaria     GERD (gastroesophageal reflux disease)     Vitamin D deficiency     Effusion of right knee     Bowel habit changes     Bloating symptom     Flatulence, eructation and gas pain       Past Medical History:   Diagnosis Date     Hypertension      Urinary retention        Past Surgical History:   Procedure Laterality Date     [place] APPENDECTOMY  "     Description: Appendectomy;  Recorded: 03/02/2012;     PROSTATE SURGERY         Social History     Socioeconomic History     Marital status:      Spouse name: Not on file     Number of children: Not on file     Years of education: Not on file     Highest education level: Not on file   Occupational History     Not on file   Social Needs     Financial resource strain: Not on file     Food insecurity:     Worry: Not on file     Inability: Not on file     Transportation needs:     Medical: Not on file     Non-medical: Not on file   Tobacco Use     Smoking status: Former Smoker     Smokeless tobacco: Never Used   Substance and Sexual Activity     Alcohol use: No     Drug use: No     Sexual activity: Yes     Partners: Female   Lifestyle     Physical activity:     Days per week: Not on file     Minutes per session: Not on file     Stress: Not on file   Relationships     Social connections:     Talks on phone: Not on file     Gets together: Not on file     Attends Alevism service: Not on file     Active member of club or organization: Not on file     Attends meetings of clubs or organizations: Not on file     Relationship status: Not on file     Intimate partner violence:     Fear of current or ex partner: Not on file     Emotionally abused: Not on file     Physically abused: Not on file     Forced sexual activity: Not on file   Other Topics Concern     Not on file   Social History Narrative     Not on file       Patient Care Team:  Fan Contreras MD as PCP - General          Objective:     Vitals:    04/04/19 0941   BP: 136/80   Pulse: 68   Weight: 201 lb 12 oz (91.5 kg)   Height: 5' 9.5\" (1.765 m)         Physical Exam:      Physical:  General Appearance: Healthy-appearingy.   Head:  No deformity  Eyes: Sclerae white, pupils equal and reactive, extra ocular movements intact left cataract extracted  Ears: Well-positioned, well-formed pinnae; TM pearly white, translucent, no bulging   Nose: Clear, normal " mucosa no drainage or crusting  Throat: Lips, tongue, and mucosa are moist, pink and intact; tongue no thrush oral pharynx no injection or lesions some missing teeth recent extraction of the left upper  Neck: Supple, symmetric ROM no nodes   No carotid Buits  Chest: Lungs clear to auscultation, no retractions  Heart: Regular rate & rhythm, S1 S2, no murmur  Abdomen: Soft, non-tender, no masses; umbilical area normal   Pulses: Equal femoral pulses  Extremities: Well-perfused, warm and dry, No Edema  Palpable Pulses Bilateral  Neuro: Easily aroused good tone NO tremor   Skin Actinic  keratosis right flank freeze thaw method frozen for 1 minute        Patient Instructions   Or reflux and gas consider certain foods might be triggering these    I will give you the typical things we do for reflux lifestyle changes  There are also supplements that are possibly helpful for reflux  One that might be helpful for you is slippery elm  Also fermented foods such is Kombucha or yogurts    Gas can be produced by any food but some of the typical offenders include fiber foods and dairy    GERD  Head of Bed Elevate to 30 Degrees  Weight Reduction  Shoot for BMI <25 or Waist Hip Ratio less 1   NO Tobacco worsens reflux  Avoid Tight Clothes  No Food 3 hours Prior to Bed  No Food within 1 Hour Exercise  Small Portions/ Chew well!  Avoid Triggers:  Chocolate/Mint/Caffeine/Alcohol/Citrus/Garlic/ Onion / Carbonated    Natural RX---- GERD   Melatonin  3- 10 mg at bedtime support lower esophageal sphincter  Alginates  1000 mg Daily  Found in OTC>>> Gaviscon Advance  Aloe Vera (not Latex brand) 10 ml Daily  DGL (Licorice)  10:1  400mg Standard   + Glycine 50 mg   DGL range 350mg - 800 mg pre - meal  Iberogast (prprietary herb blend) 1 ml 2-3 x Daily  Tea Bag Licorice Tea  Sips throughout the Day  Slippery Elm   Probiotics Lactobicillus/Bifidus Species              EKG:  Not indicated     Labs:  Labs pending at this time.  Results will be  reviewed when available.    Immunization History   Administered Date(s) Administered     DT (pediatric) 01/01/1997     Influenza [id number], 12/18/2009     Influenza high dose, seasonal 11/12/2015, 12/16/2016, 12/15/2017, 11/13/2018     Influenza, Seasonal, Inj PF IIV3 10/31/2013     Influenza, inj, historic,unspecified 10/28/2004, 10/30/2006, 10/25/2007, 10/29/2008     Influenza, seasonal,quad inj 6-35 mos 10/29/2009, 10/11/2010, 10/25/2011, 10/30/2012     Influenza,seasonal quad, PF 09/29/2014     Pneumo Conj 13-V (2010&after) 03/23/2015     Pneumo Polysac 23-V 10/01/2003     Td,adult,historic,unspecified 07/01/2008     Tdap 07/01/2008     ZOSTER, LIVE 01/04/2013           Electronically signed by Fan Contreras MD 04/04/19 9:52 AM                                        Assessment and Plan:         Problem List Items Addressed This Visit     Vitamin D deficiency    Vitamin B12 deficiency    Relevant Orders    Vitamin B12    GERD (gastroesophageal reflux disease)    Benign non-nodular prostatic hyperplasia with lower urinary tract symptoms    Actinic Keratosis    Abnormal Blood Chemistry    Relevant Orders    Comprehensive Metabolic Panel    Magnesium, Red Blood Cell    HM1(CBC and Differential) (Completed)    Lipid Cascade    Thyroid [place]    HM1 (CBC with Diff) (Completed)      Other Visit Diagnoses     Well adult exam    -  Primary    Relevant Orders    Comprehensive Metabolic Panel    Vitamin B12    Magnesium, Red Blood Cell    HM1(CBC and Differential) (Completed)    Lipid Cascade    Thyroid [place]    HM1 (CBC with Diff) (Completed)    Blurry vision, left eye                The patient's current medical problems were reviewed.    I have had an Advance Directives discussion with the patient.  The following health maintenance schedule was reviewed with the patient and provided in printed form in the after visit summary:   Health Maintenance   Topic Date Due     ZOSTER VACCINES (2 of 3) 03/01/2013     TD 18+ HE   07/01/2018     FALL RISK ASSESSMENT  02/06/2019     ADVANCE DIRECTIVES DISCUSSED WITH PATIENT  02/06/2023     PNEUMOCOCCAL POLYSACCHARIDE VACCINE AGE 65 AND OVER  Completed     INFLUENZA VACCINE RULE BASED  Completed     PNEUMOCOCCAL CONJUGATE VACCINE FOR ADULTS (PCV13 OR PREVNAR)  Completed        Subjective:   Chief Complaint: Foster Enriquez is an 83 y.o. male here for an Annual Wellness visit.   HPI:      Review of Systems:   Please see above.  The rest of the review of systems are negative for all systems.  Gassiness    Blurry vision      Patient Care Team:  Fan Contreras MD as PCP - General     Patient Active Problem List   Diagnosis     Cataract     Vitamin B12 deficiency     Dyslipidemia     Benign non-nodular prostatic hyperplasia with lower urinary tract symptoms     Actinic Keratosis     Abnormal Blood Chemistry     Benign Adenomatous Polyp Of The Large Intestine     Benign Hypertensive Heart Disease With Congestive Heart Failure     Essential Hypertension     Idiopathic Urticaria     GERD (gastroesophageal reflux disease)     Vitamin D deficiency     Effusion of right knee     Bowel habit changes     Bloating symptom     Flatulence, eructation and gas pain     Past Medical History:   Diagnosis Date     Hypertension      Urinary retention       Past Surgical History:   Procedure Laterality Date     [place] APPENDECTOMY      Description: Appendectomy;  Recorded: 03/02/2012;     PROSTATE SURGERY        Family History   Problem Relation Age of Onset     Arthritis Mother      Colon cancer Mother      Hypertension Mother      Cancer Brother         BLADDER     Hypertension Brother      Cancer Daughter       Social History     Socioeconomic History     Marital status:      Spouse name: Not on file     Number of children: Not on file     Years of education: Not on file     Highest education level: Not on file   Occupational History     Not on file   Social Needs     Financial resource strain: Not on  "file     Food insecurity:     Worry: Not on file     Inability: Not on file     Transportation needs:     Medical: Not on file     Non-medical: Not on file   Tobacco Use     Smoking status: Former Smoker     Smokeless tobacco: Never Used   Substance and Sexual Activity     Alcohol use: No     Drug use: No     Sexual activity: Yes     Partners: Female   Lifestyle     Physical activity:     Days per week: Not on file     Minutes per session: Not on file     Stress: Not on file   Relationships     Social connections:     Talks on phone: Not on file     Gets together: Not on file     Attends Restorationism service: Not on file     Active member of club or organization: Not on file     Attends meetings of clubs or organizations: Not on file     Relationship status: Not on file     Intimate partner violence:     Fear of current or ex partner: Not on file     Emotionally abused: Not on file     Physically abused: Not on file     Forced sexual activity: Not on file   Other Topics Concern     Not on file   Social History Narrative     Not on file      Current Outpatient Medications   Medication Sig Dispense Refill     amLODIPine (NORVASC) 5 MG tablet TAKE 1 TABLET(5 MG) BY MOUTH DAILY 90 tablet 3     aspirin 81 MG EC tablet Take 81 mg by mouth daily.       cholecalciferol, vitamin D3, 1,000 unit tablet Take 1,000 Units by mouth daily.       dutasteride (AVODART) 0.5 mg capsule TAKE 1 CAPSULE BY MOUTH DAILY 90 capsule 3     hydrocortisone (ANUSOL-HC) 2.5 % rectal cream Insert into the rectum 2 (two) times a day. 30 g 3     omeprazole (PRILOSEC) 20 MG capsule TAKE 1 CAPSULE BY MOUTH EVERY DAY 90 capsule 2     No current facility-administered medications for this visit.       Objective:   Vital Signs:   Visit Vitals  /80 (Patient Site: Right Arm, Patient Position: Sitting, Cuff Size: Adult Large)   Pulse 68   Ht 5' 9.5\" (1.765 m)   Wt 201 lb 12 oz (91.5 kg)   BMI 29.37 kg/m         VisionScreening:  No exam data present "     PHYSICAL EXAM  See above     Assessment Results 4/4/2019   Activities of Daily Living No help needed   Instrumental Activities of Daily Living No help needed   Mini Cog Total Score 4   Some recent data might be hidden     A Mini-Cog score of 0-2 suggests the possibility of dementia, score of 3-5 suggests no dementia    Identified Health Risks:     He is at risk for lack of exercise and has been provided with information to increase physical activity for the benefit of his well-being.  The patient was counseled and encouraged to consider modifying their diet and eating habits. He was provided with information on recommended healthy diet options.  Information regarding advance directives (living fonseca), including where he can download the appropriate form, was provided to the patient via the AVS.

## 2021-05-28 NOTE — TELEPHONE ENCOUNTER
FYI - Status Update  Who is Calling: Patient's Wife  Update: Below PSA results were relayed to patient's wife. Patient gave verbal consent to provide his wife with medical information. Patient instructed to reach out to Dr. Munoz with any additional questions in regards to patient's PSA results.  Okay to leave a detailed message?:  No return call needed       PSA, Annual Screen (Prostatic-Specific Antigen)   Order: [id number]   Status:  Final result   Visible to patient:  Yes (MyChart) Next appt:  None Dx:  Encounter for screening for malignant...     Ref Range & Units 4/22/19 0916 2/6/18 0909    PSA 0.0 - 6.5 ng/mL 0.5  0.5    Resulting Agency  [facility]      Narrative   Performed by: [facility]   Method is Abbott Prostate-Specific Antigen (PSA)  Standard-WHO 1st International (90:10)      Specimen Collected: 04/22/19 09:16 Last Resulted: 04/22/19 16:46 Lab Flowsheet Order Details View Encounter Lab and Collection Details Routing Result History

## 2021-05-29 NOTE — TELEPHONE ENCOUNTER
Refill Approved    Rx renewed per Medication Renewal Policy. Medication was last renewed on 3/16/18.    Lashae Abdul, [facility] Triage/Med Refill 5/22/2019     Requested Prescriptions   Pending Prescriptions Disp Refills     omeprazole (PRILOSEC) 20 MG capsule [Pharmacy Med Name: OMEPRAZOLE 20MG CAPSULES] 90 capsule 0     Sig: TAKE 1 CAPSULE BY MOUTH DAILY       GI Medications Refill Protocol Passed - 5/21/2019 10:21 AM        Passed - PCP or prescribing provider visit in last 12 or next 3 months.     Last office visit with prescriber/PCP: 11/13/2018 Fan Contreras MD OR same dept: 11/13/2018 Fan Contreras MD OR same specialty: 11/13/2018 Fan Contreras MD  Last physical: 4/4/2019 Last MTM visit: Visit date not found   Next visit within 3 mo: Visit date not found  Next physical within 3 mo: Visit date not found  Prescriber OR PCP: Fan Contreras MD  Last diagnosis associated with med order: 1. GERD (gastroesophageal reflux disease)  - omeprazole (PRILOSEC) 20 MG capsule [Pharmacy Med Name: OMEPRAZOLE 20MG CAPSULES]; TAKE 1 CAPSULE BY MOUTH DAILY  Dispense: 90 capsule; Refill: 0    If protocol passes may refill for 12 months if within 3 months of last provider visit (or a total of 15 months).

## 2021-05-30 VITALS — HEIGHT: 70 IN | BODY MASS INDEX: 29.35 KG/M2 | WEIGHT: 205 LBS

## 2021-05-30 VITALS — BODY MASS INDEX: 30.42 KG/M2 | WEIGHT: 206 LBS

## 2021-05-31 VITALS — BODY MASS INDEX: 28.94 KG/M2 | WEIGHT: 200.25 LBS

## 2021-05-31 VITALS — WEIGHT: 203 LBS | HEIGHT: 70 IN | BODY MASS INDEX: 29.06 KG/M2

## 2021-06-01 VITALS — WEIGHT: 206.5 LBS | BODY MASS INDEX: 29.56 KG/M2 | HEIGHT: 70 IN

## 2021-06-02 ENCOUNTER — RECORDS - HEALTHEAST (OUTPATIENT)
Dept: ADMINISTRATIVE | Facility: CLINIC | Age: 86
End: 2021-06-02

## 2021-06-02 VITALS — WEIGHT: 201.75 LBS | HEIGHT: 70 IN | BODY MASS INDEX: 28.88 KG/M2

## 2021-06-02 VITALS — WEIGHT: 200.25 LBS | BODY MASS INDEX: 28.94 KG/M2

## 2021-06-04 VITALS
HEIGHT: 70 IN | SYSTOLIC BLOOD PRESSURE: 124 MMHG | HEART RATE: 80 BPM | WEIGHT: 195 LBS | DIASTOLIC BLOOD PRESSURE: 60 MMHG | BODY MASS INDEX: 27.92 KG/M2

## 2021-06-06 NOTE — TELEPHONE ENCOUNTER
Refill Approved    Rx renewed per Medication Renewal Policy. Medication was last renewed on 11/30/19.    Lashae Abdul, [facility] Triage/Med Refill 2/27/2020     Requested Prescriptions   Pending Prescriptions Disp Refills     amLODIPine (NORVASC) 5 MG tablet [Pharmacy Med Name: AMLODIPINE BESYLATE 5MG TABLETS] 90 tablet 0     Sig: TAKE 1 TABLET BY MOUTH DAILY       Calcium-Channel Blockers Protocol Passed - 2/27/2020 10:16 AM        Passed - PCP or prescribing provider visit in past 12 months or next 3 months     Last office visit with prescriber/PCP: 11/13/2018 Fan Contreras MD OR same dept: Visit date not found OR same specialty: 11/13/2018 Fan Contreras MD  Last physical: 4/4/2019 Last MTM visit: Visit date not found   Next visit within 3 mo: Visit date not found  Next physical within 3 mo: Visit date not found  Prescriber OR PCP: Fan Contreras MD  Last diagnosis associated with med order: 1. Hypertension  - amLODIPine (NORVASC) 5 MG tablet [Pharmacy Med Name: AMLODIPINE BESYLATE 5MG TABLETS]; TAKE 1 TABLET BY MOUTH DAILY  Dispense: 90 tablet; Refill: 0    If protocol passes may refill for 12 months if within 3 months of last provider visit (or a total of 15 months).             Passed - Blood pressure filed in past 12 months     BP Readings from Last 1 Encounters:   04/04/19 136/80

## 2021-06-08 NOTE — PROGRESS NOTES
"CHIEF COMPLAINT: Foster Enriquez had concerns including Spots; Shoulder Pain; Neck Pain; and Back Pain.    [place]: 1.............. had concerns including Spots; Shoulder Pain; Neck Pain; and Back Pain.    1. Cervical radiculopathy    2. Actinic keratosis    3. Wart           CC:              Spots, shoulder pain, neck pain and back pain.    TIngling Pinching  Chiro 3 -4 time   Activator THerapy    Numbness down around        What makes it worse :       Sleeping on right the left side \"numb\"  What makes it better:         Moving arm/Biofreeze  How long is it ongoing:      Couple of months  0/10-10/10:                        2  What's it like:                      Numbness-off and on    Associated Sx:   Numbness and tingling        SUBJECTIVE:  Foster Enriquez is a 81 y.o. male    Past Medical History   Diagnosis Date     Hypertension      Urinary retention      Past Surgical History   Procedure Laterality Date     Pr appendectomy       Description: Appendectomy;  Recorded: 03/02/2012;     Prostate surgery       Latex; Nitroglycerin; Sulfa (sulfonamide antibiotics); and Ciprofloxacin  Current Outpatient Prescriptions   Medication Sig Dispense Refill     amLODIPine (NORVASC) 5 MG tablet TAKE 1 TABLET BY MOUTH EVERY DAY 90 tablet 2     aspirin 81 MG EC tablet Take 81 mg by mouth daily.       AVODART 0.5 mg capsule TAKE 1 CAPSULE BY MOUTH DAILY 90 capsule 3     hydrocortisone (ANUSOL-HC) 2.5 % rectal cream Insert into the rectum 2 (two) times a day. 30 g 3     omeprazole (PRILOSEC) 20 MG capsule TAKE ONE CAPSULE BY MOUTH EVERY DAY (Patient taking differently: TAKE ONE CAPSULE BY MOUTH PRN) 90 capsule 0     psyllium seed, sugar, (METAMUCIL) Powd 28.3       No current facility-administered medications for this visit.      Family History   Problem Relation Age of Onset     Arthritis Mother      Colon cancer Mother      Hypertension Mother      Cancer Brother      BLADDER     Hypertension Brother      Cancer Daughter  "     Social History     Social History     Marital status:      Spouse name: N/A     Number of children: N/A     Years of education: N/A     Social History Main Topics     Smoking status: Former Smoker     Smokeless tobacco: Never Used     Alcohol use No     Drug use: No     Sexual activity: Yes     Partners: Female     Other Topics Concern     None     Social History Narrative     Patient Active Problem List   Diagnosis     Cataract     Urinary Tract Infection     Acute Urinary Retention     Skin Abscess     Vitamin B12 Deficiency     Dyslipidemia     Benign Prostatic Hypertrophy     Actinic Keratosis     Abnormal Blood Chemistry     Dizziness     Benign Adenomatous Polyp Of The Large Intestine     Benign Hypertensive Heart Disease With Congestive Heart Failure     Essential Hypertension     Idiopathic Urticaria     GERD (gastroesophageal reflux disease)     Vitamin D deficiency     Effusion of right knee                                              SOCIAL: He  reports that he has quit smoking. He has never used smokeless tobacco. He reports that he does not drink alcohol or use illicit drugs.    REVIEW OF SYSTEMS:     Family reviewed and not pertinent to presenting issue   Review of systems otherwise negative as requested from patient, except   Positive ROS outlined and discussed in [place].    OBJECTIVE:  Visit Vitals     /62 (Patient Site: Left Arm, Patient Position: Sitting, Cuff Size: Adult Large)     Pulse 67     Wt 206 lb (93.4 kg)     SpO2 98%     BMI 30.42 kg/m2       GENERAL:     No acute distress.   Alert and oriented X 3         Physical:    Spurling's maneuver really negative with extension is well as flexion  Adson's maneuver is negative  With abduction of the shoulder some pinching into and numbness into the left shoulder blade  AC joint mild crepitus but does not reproduce his pain  Strength testing flexion extension the elbow bilateral 5 over 5  Abduction of the shoulder 5 over 5  bilaterally  Intrinsic muscle hand strength 5 over 5  Missing his left distal index finger  Pulses palpable  Deep tendon reflexes symmetric  Right temple seborrheic 4 mm keratosis, actinic 1 cm keratosis, left thigh 5 mm wart, these were frozen free thaw method each for 2 minutes.      ASSESSMENT & PLAN      Foster was seen today for spots, shoulder pain, neck pain and back pain.    Diagnoses and all orders for this visit:    Cervical radiculopathy    Actinic keratosis    Wart      Return if symptoms worsen or fail to improve.       Anticipatory Guidance and Symptomatic Cares Discussed   Advised to call back directly if there are further questions, or if these symptoms fail to improve as anticipated or worsen.  Return to clinic if patient has a clinical concern that warrants an exam.        20 Min Total Time, > 50% counseling and coordination of Care    Fan Contreras MD  Family Medicine   [facility] [id number] [phone]

## 2021-06-08 NOTE — TELEPHONE ENCOUNTER
RN cannot approve Refill Request    RN can NOT refill this medication PCP messaged that patient is overdue for Labs and Office Visit. Last office visit: 11/13/2018 Fan Contreras MD Last Physical: 4/4/2019 Last MTM visit: Visit date not found Last visit same specialty: 11/13/2018 Fan Contreras MD.  Next visit within 3 mo: Visit date not found  Next physical within 3 mo: Visit date not found      Tonia Ramierz, [facility] Triage/Med Refill 5/31/2020    Requested Prescriptions   Pending Prescriptions Disp Refills     amLODIPine (NORVASC) 5 MG tablet [Pharmacy Med Name: AMLODIPINE BESYLATE 5MG TABLETS] 90 tablet 0     Sig: TAKE 1 TABLET BY MOUTH DAILY       Calcium-Channel Blockers Protocol Failed - 5/28/2020 10:11 AM        Failed - PCP or prescribing provider visit in past 12 months or next 3 months     Last office visit with prescriber/PCP: 11/13/2018 Fan Contreras MD OR same dept: Visit date not found OR same specialty: 11/13/2018 Fan Contreras MD  Last physical: 4/4/2019 Last MTM visit: Visit date not found   Next visit within 3 mo: Visit date not found  Next physical within 3 mo: Visit date not found  Prescriber OR PCP: Fan Contreras MD  Last diagnosis associated with med order: 1. Hypertension  - amLODIPine (NORVASC) 5 MG tablet [Pharmacy Med Name: AMLODIPINE BESYLATE 5MG TABLETS]; TAKE 1 TABLET BY MOUTH DAILY  Dispense: 90 tablet; Refill: 0    If protocol passes may refill for 12 months if within 3 months of last provider visit (or a total of 15 months).             Failed - Blood pressure filed in past 12 months     BP Readings from Last 1 Encounters:   04/04/19 136/80

## 2021-06-08 NOTE — PROGRESS NOTES
CHIEF COMPLAINT: Foster Enriquez had concerns including Annual Exam and Medication Refill.    [place]: 1.............. had concerns including Annual Exam and Medication Refill.    1. Benign non-nodular prostatic hyperplasia with lower urinary tract symptoms    2. HTN (hypertension)    3. Hypertension    4. Vitamin D deficiency    5. Vitamin B12 Deficiency    6. Essential hypertension with goal blood pressure less than 140/90    7. Dyslipidemia    8. Cervical radiculopathy    9. Cataract    10. Screening for prostate cancer           CC:              Annual Exam and med refill.    Neck Shoulder Left right in shoulder      What makes it worse :      Way move arm then numb  What makes it better:        Certain postion changes  How long is it ongoin weeks  Before [facility]   0/10-10/10:                        2-10  What's it like:                     Sleep biting pain    Associated Sx:   Sharp and numbess        SUBJECTIVE:  Foster Enriquez is a 81 y.o. male    Past Medical History:   Diagnosis Date     Hypertension      Urinary retention      Past Surgical History:   Procedure Laterality Date     [place] APPENDECTOMY      Description: Appendectomy;  Recorded: 2012;     PROSTATE SURGERY       Latex; Nitroglycerin; Sulfa (sulfonamide antibiotics); and Ciprofloxacin  Current Outpatient Prescriptions   Medication Sig Dispense Refill     amLODIPine (NORVASC) 5 MG tablet TAKE 1 TABLET BY MOUTH EVERY DAY 90 tablet 3     aspirin 81 MG EC tablet Take 81 mg by mouth daily.       CHOLECALCIFEROL, VITAMIN D3, (VITAMIN D3 ORAL) Take 400 Units by mouth daily.       dutasteride (AVODART) 0.5 mg capsule TAKE 1 CAPSULE BY MOUTH DAILY 90 capsule 3     hydrocortisone (ANUSOL-HC) 2.5 % rectal cream Insert into the rectum 2 (two) times a day. 30 g 3     omeprazole (PRILOSEC) 20 MG capsule TAKE ONE CAPSULE BY MOUTH EVERY DAY (Patient taking differently: TAKE ONE CAPSULE BY MOUTH PRN) 90 capsule 0     psyllium seed, sugar,  "(METAMUCIL) Powd 28.3       No current facility-administered medications for this visit.      Family History   Problem Relation Age of Onset     Arthritis Mother      Colon cancer Mother      Hypertension Mother      Cancer Brother      BLADDER     Hypertension Brother      Cancer Daughter      Social History     Social History     Marital status:      Spouse name: N/A     Number of children: N/A     Years of education: N/A     Social History Main Topics     Smoking status: Former Smoker     Smokeless tobacco: Never Used     Alcohol use No     Drug use: No     Sexual activity: Yes     Partners: Female     Other Topics Concern     Not on file     Social History Narrative     Patient Active Problem List   Diagnosis     Cataract     Urinary Tract Infection     Acute Urinary Retention     Skin Abscess     Vitamin B12 Deficiency     Dyslipidemia     Benign non-nodular prostatic hyperplasia with lower urinary tract symptoms     Actinic Keratosis     Abnormal Blood Chemistry     Dizziness     Benign Adenomatous Polyp Of The Large Intestine     Benign Hypertensive Heart Disease With Congestive Heart Failure     Essential Hypertension     Idiopathic Urticaria     GERD (gastroesophageal reflux disease)     Vitamin D deficiency     Effusion of right knee                                              SOCIAL: He  reports that he has quit smoking. He has never used smokeless tobacco. He reports that he does not drink alcohol or use illicit drugs.    REVIEW OF SYSTEMS:     Family reviewed and not pertinent to presenting issue   Review of systems otherwise negative as requested from patient, except   Positive ROS outlined and discussed in [place].    OBJECTIVE:  Visit Vitals     /64 (Patient Site: Left Arm, Patient Position: Sitting, Cuff Size: Adult Large)     Pulse 72     Temp 97.8  F (36.6  C) (Oral)     Ht 5' 9.75\" (1.772 m)     Wt 205 lb (93 kg)     SpO2 97%     BMI 29.63 kg/m2       GENERAL:     No acute distress. "   Alert and oriented X 3         Physical:    Cataracts mild  Multiple keratoses of the face  Nasal glucoses clinics and oropharynx is clear except for white coating of the tongue which is chronic  TMs are clear cor pulses are full without bruits  No cervical or subclavicular nodes  Tender to palpation C5-6 left cervical spine  Strength testing upper extremities 5 over 5  Lungs clear  Cardiac S1-S2 reassessed appreciable murmur  Abdomen soft nontender  Declined a prostate exam  Supple no edema  Excellent distal pulses        ASSESSMENT & PLAN      Foster was seen today for annual exam and medication refill.    Diagnoses and all orders for this visit:    Benign non-nodular prostatic hyperplasia with lower urinary tract symptoms  -     NMR with Lipid    HTN (hypertension)  -     dutasteride (AVODART) 0.5 mg capsule; TAKE 1 CAPSULE BY MOUTH DAILY  -     Cancel: Electrocardiogram Perform and Read  -     Comprehensive Metabolic Panel  -     HM1(CBC and Differential)  -     Thyroid Cascade  -     HM1 (CBC with Diff)    Hypertension  -     amLODIPine (NORVASC) 5 MG tablet; TAKE 1 TABLET BY MOUTH EVERY DAY    Vitamin D deficiency  -     Vitamin D, Total (25-Hydroxy)    Vitamin B12 Deficiency  -     Vitamin B12    Essential hypertension with goal blood pressure less than 140/90    Dyslipidemia  -     NMR with Lipid    Cervical radiculopathy  -     XR Cervical Spine 2 - 3 VWS; Future    Cataract  -     Cancel: Electrocardiogram Perform and Read  -     Electrocardiogram Perform and Read    Screening for prostate cancer  -     PSA (Prostatic-Specific Antigen), Annual Screen        We talked about risk for heart disease continue to control blood pressure report any change in exercise tolerance  Continue baby aspirin  Patient is acceptable candidate for anesthesia cataract surgery he will take drops as instructed  EKG ordered    Return if symptoms worsen or fail to improve.       Anticipatory Guidance and Symptomatic Cares  Discussed   Advised to call back directly if there are further questions, or if these symptoms fail to improve as anticipated or worsen.  Return to clinic if patient has a clinical concern that warrants an exam.        25   Min Total Time, > 50% counseling and coordination of Care    Fan Contreras MD  [facility] [id number]  [phone]      PREOPERATIVE HISTORY AND PHYSICAL EXAM     SITUATIONAL DATA ELEMENTS:  N/A    SUBJCTIVE  Patient is being seen today for Preoperative Consultation at the request of Dr. tapia for the underlying condition of cataract.    Exam Date: 02/03/17  Examiner: Dr.Daniel Ben MD ([facility])  Primary Provider: Fan Contreras MD  Surgery Date:  2/23  Scheduled Surgery: extract cataract    HISTORY OF PRESENT ILLNESS:  Foster Enriquez is a 81 y.o. male year old who last saw me 1/9/2017.    Past/Current Medical Problems  Patient Active Problem List   Diagnosis     Cataract     Urinary Tract Infection     Acute Urinary Retention     Skin Abscess     Vitamin B12 Deficiency     Dyslipidemia     Benign non-nodular prostatic hyperplasia with lower urinary tract symptoms     Actinic Keratosis     Abnormal Blood Chemistry     Dizziness     Benign Adenomatous Polyp Of The Large Intestine     Benign Hypertensive Heart Disease With Congestive Heart Failure     Essential Hypertension     Idiopathic Urticaria     GERD (gastroesophageal reflux disease)     Vitamin D deficiency     Effusion of right knee       Hospitalizations:  None    Surgeries:    Past Surgical History:   Procedure Laterality Date     [place] APPENDECTOMY      Description: Appendectomy;  Recorded: 03/02/2012;     PROSTATE SURGERY         Family History:  Foster Enriquez's family history includes Arthritis in his mother; Cancer in his brother and daughter; Colon cancer in his mother; Hypertension in his brother and mother.  Anesthesia Reactions: None  Bleeding  Disorders: None    Social History:  he  reports that he has quit smoking. He has never used smokeless tobacco. He reports that he does not drink alcohol or use illicit drugs.    Medications:    Current Outpatient Prescriptions:      amLODIPine (NORVASC) 5 MG tablet, TAKE 1 TABLET BY MOUTH EVERY DAY, Disp: 90 tablet, Rfl: 3     aspirin 81 MG EC tablet, Take 81 mg by mouth daily., Disp: , Rfl:      CHOLECALCIFEROL, VITAMIN D3, (VITAMIN D3 ORAL), Take 400 Units by mouth daily., Disp: , Rfl:      dutasteride (AVODART) 0.5 mg capsule, TAKE 1 CAPSULE BY MOUTH DAILY, Disp: 90 capsule, Rfl: 3     hydrocortisone (ANUSOL-HC) 2.5 % rectal cream, Insert into the rectum 2 (two) times a day., Disp: 30 g, Rfl: 3     omeprazole (PRILOSEC) 20 MG capsule, TAKE ONE CAPSULE BY MOUTH EVERY DAY (Patient taking differently: TAKE ONE CAPSULE BY MOUTH PRN), Disp: 90 capsule, Rfl: 0     psyllium seed, sugar, (METAMUCIL) Powd, 28.3, Disp: , Rfl:   HELD MEDICATIONS: None.    Allergies:  No latex allergies.  Allergies   Allergen Reactions     Latex      Nitroglycerin      Annotation: Severe reaction- hypotension       Sulfa (Sulfonamide Antibiotics)      Ciprofloxacin Rash       REVIEW OF SYSTEMS:  Remainder of complete head to toe negative.   CARDIOVASCULAR: Negative for CAD, CHF, Valvular Disease, +HTN, Atrial Fibrillation, Other Arrhythmia, ICD/Pacer, Peripheral Vascular Disease.  PULMONARY: Negative for COPD, Asthma, Apnea.  RENAL: Negative for Chronic diuretic use, Renal Failure, Dialysis.  ENDOCRINE: Negative for Diabetes, Chronic steroid use, Thyroid Disease.  GI: Negative for GERD, Hiatal Hernia, Hepatic Disease.  NEURO: Negative for CVA, TIA, Seizures, Neuropathy.  HEMATOLOGIC DISEASE: Negative for Clotting Disorder, Bleeding Disorder, Anemia.  MUSCULOSKELETAL: Negative for Arthritis, Muscular Dystrophy.  MENTAL HEALTH: Negative for Anxiety, Depression, Dementia, ETOH/Drug History.  OTHER: Negative for increased risk for excessive  blood loss, potential for refusing blood products, MRSA, VRE, HIV, AIDS, TB or pregnancy in women.    PHYSICAL EXAMINATION  CONSTITUTIONAL - In general, no acute distress.  Vitals:    02/03/17 0925   BP: 124/64   Pulse: 72   Temp: 97.8  F (36.6  C)   SpO2: 97%         DATA:  LABS:   Physical on 02/03/2017   Component Date Value Ref Range Status     WBC 02/03/2017 9.1  4.0 - 11.0 thou/uL Final     RBC 02/03/2017 5.54  4.40 - 6.20 mill/uL Final     Hemoglobin 02/03/2017 16.5  14.0 - 18.0 g/dL Final     Hematocrit 02/03/2017 49.8  40.0 - 54.0 % Final     MCV 02/03/2017 90  80 - 100 fL Final     MCH 02/03/2017 29.7  27.0 - 34.0 pg Final     MCHC 02/03/2017 33.1  32.0 - 36.0 g/dL Final     RDW 02/03/2017 13.1  11.0 - 14.5 % Final     Platelets 02/03/2017 183  140 - 440 thou/uL Final     MPV 02/03/2017 8.1  7.0 - 10.0 fL Final     Neutrophils % 02/03/2017 65  50 - 70 % Final     Lymphocytes % 02/03/2017 23  20 - 40 % Final     Monocytes % 02/03/2017 8  2 - 10 % Final     Eosinophils % 02/03/2017 4  0 - 6 % Final     Basophils % 02/03/2017 1  0 - 2 % Final     Neutrophils Absolute 02/03/2017 5.9  2.0 - 7.7 thou/uL Final     Lymphocytes Absolute 02/03/2017 2.1  0.8 - 4.4 thou/uL Final     Monocytes Absolute 02/03/2017 0.7  0.0 - 0.9 thou/uL Final     Eosinophils Absolute 02/03/2017 0.4  0.0 - 0.4 thou/uL Final     Basophils Absolute 02/03/2017 0.1  0.0 - 0.2 thou/uL Final     VENTRICULAR RATE 02/03/2017 63  BPM Incomplete     ATRIAL RATE 02/03/2017 63  BPM Incomplete     P-R INTERVAL 02/03/2017 160  ms Incomplete     QRS DURATION 02/03/2017 92  ms Incomplete     Q-T INTERVAL 02/03/2017 428  ms Incomplete     QTC CALCULATION (BEZET) 02/03/2017 437  ms Incomplete     P Axis 02/03/2017 49  degrees Incomplete     R AXIS 02/03/2017 -20  degrees Incomplete     T AXIS 02/03/2017 39  degrees Incomplete     MUSE DIAGNOSIS 02/03/2017    Incomplete                    Value:Normal sinus rhythm  Normal ECG  When compared with ECG of  03-MAY-2016 14:58,  No significant change was found     ]  EKG: Compare to 5/2016  NSR   Normal ST/[place]  Not appreciated BBB    CHEST XRAY:     Not/Indicated    PREGNANCY TEST: N/A    ASSESSMENT & PLAN  Foster was seen today for annual exam and medication refill.    Diagnoses and all orders for this visit:    Benign non-nodular prostatic hyperplasia with lower urinary tract symptoms  -     NMR with Lipid    HTN (hypertension)  -     dutasteride (AVODART) 0.5 mg capsule; TAKE 1 CAPSULE BY MOUTH DAILY  -     Cancel: Electrocardiogram Perform and Read  -     Comprehensive Metabolic Panel  -     HM1(CBC and Differential)  -     Thyroid Cascade  -     HM1 (CBC with Diff)    Hypertension  -     amLODIPine (NORVASC) 5 MG tablet; TAKE 1 TABLET BY MOUTH EVERY DAY    Vitamin D deficiency  -     Vitamin D, Total (25-Hydroxy)    Vitamin B12 Deficiency  -     Vitamin B12    Essential hypertension with goal blood pressure less than 140/90    Dyslipidemia  -     NMR with Lipid    Cervical radiculopathy  -     XR Cervical Spine 2 - 3 VWS; Future    Cataract  -     Cancel: Electrocardiogram Perform and Read  -     Electrocardiogram Perform and Read    Screening for prostate cancer  -     PSA (Prostatic-Specific Antigen), Annual Screen        PLAN:  Patient approved for surgery with general or local anesthesia.  Post-operative pain to be managed by Surgeon during post-operative timeframe, as defined by [facility]  Postoperative Care will be managed by Hospital Service, if admitted.  Stop Aspirin, NSAID's (Ibuprofen, Aleve) and other Medications as relayed to Patient if surgical team desires  Otherwise continue all meds  Follow Recommendations for Preoperative Medications from Surgery Team  Above recommendations were reviewed with patient.    INFORM OUR OFFICE OR SURGEON  IF ANY NEW ILLNESS OR CLINICAL CONCERN FELT PERTINENT TO SAFELY PROCEEDING WITH SURGERY     Preoperative Cardiac Risk Stratificaiton and  Management    Acceptable Risk Cardiac risk assessment  Beta-blocker protocol   Not indicated    NO active Cardiac Conditions including     NO Unstable/Severe Angina,   NO Recent Myocardial Infarction (<3 mo),   NO New, worsening or decompensated heart failure,   NO Significant Arrhythmias,   NO Severe Valvular Disease]    Low Risk Surgery.  Functional Capacity > 4 METS. Assessed such as: (Walking 4 MPH, Shoveling Snow, Mowing Lawn, Heavy Cleaning (Washing Windows, Vacuuming or Mopping)  No Beta Blockage/cardiac evaluation:  No Ischemic Heart Disease; Compensated or prior heart failure; Diabetes mellitus;  Chronic kidney disease; Cerebrovascular disease.     Final Disposition  Proceed with proposed surgery without additional clinical clarifications  No Cardiology consultation or non-invasive testing.

## 2021-06-10 NOTE — PROGRESS NOTES
Assessment/Plan:      Diagnoses and all orders for this visit:    Cervical radicular pain  -     gabapentin (NEURONTIN) 100 MG capsule; 1 cap at bedtime x 3 days.  Then may take 2 caps at bedtime x 3 days, then may take 3 caps at bedtime  Dispense: 90 capsule; Refill: 2    Neural foraminal stenosis of cervical spine  -     gabapentin (NEURONTIN) 100 MG capsule; 1 cap at bedtime x 3 days.  Then may take 2 caps at bedtime x 3 days, then may take 3 caps at bedtime  Dispense: 90 capsule; Refill: 2    Myofascial muscle pain    Compression fracture of L1 lumbar vertebra        Assessment: Pleasant 81-year-old gentleman with a history of hypertension hyperlipidemia with:    1.  Three-month history of left-sided neck and parascapular pain.  Most consistent with C5 radiculopathy/radiculitis.  Has been improving with chiropractic and over-the-door traction.  He has multilevel degenerative disc disease and facet arthropathy resulting in severe foraminal stenosis left at C4-5 and C5-6.    2.  Myofascial pain in the parascapular region.  3.  Chronic lumbar spine pain.  Chronic L1 compression fracture on plain films.  Also appears to have spondylosis changes.      Discussion:    1.  I discussed the diagnoses and treatment options for the cervical spine and lumbar spine.  He has been improving with regard to his cervical spine and his back pain is stable for the past 30 years.  We discussed the options of further diagnostics, injections, therapy, medications.  2.  Trial gabapentin for the left cervical radicular pain.  3.  Not interested in further physical therapy at this time.  He would like to continue his chiropractic and over-the-door traction.  4.  We will monitor lumbar spine pain.  5.  We will contact me as needed if symptoms worsen or do not continue to improve      It was our pleasure caring for your patient today, if there any questions or concerns please do not hesitate to contact us.      Subjective:   Patient ID:  Foster Enriquez is a 81 y.o. male.    History of Present Illness: The patient presents at the request of Dr. Contreras for evaluation of cervical spine pain left parascapular pain and shoulder pain.  This started in February.  No trauma.  Sharp pain throughout the left upper trapezius to the left shoulder and into the cervical spine.  Seem to be worse with sleeping on his left side better with sleeping on his right side.  Better side bending to his right eye which helps the tingling.  He has numbness in the parascapular region and a sharp pain in there as well.  Nothing down the arm numbness tingling or weakness distally.    Has been seeing chiropractor.  Has been going several years due to low back pain.  They were doing some manipulation and started over the door traction.  Since starting the traction he has made significant improvement in his symptoms.  Now he will have some symptoms in the morning and will decide but his head to the right and that will improve things.  Takes occasional ibuprofen.    He also has low back pain.  30 years ago was lifting a 55 gallon drum and felt the pain in the back.  Tells me he is not a surgical candidate.  Has been treating with chiropractic occasionally has been doing fairly well with the lumbar spine.  Nothing down the legs numbness tingling or weakness.    Imaging: MRI report and images were personally reviewed and discussed with the patient.  A plastic model was utilized during the discussion.  MRI of the cervical spine personally reviewed.  Multilevel degenerative disc disease and facet arthropathy.  No high-grade spinal cord compression.  There is multilevel foraminal stenosis severe bilateral C4-5 and at C5-6.    Plain films of the lumbar spinfrom April 2016 personally reviewed.  Chronic L1 vertebral compression fracture.  Appeared to slightly progressed from prior study.  Multilevel degenerative disc disease otherwise.  Normal alignment.       review of systems:  Complains of change in vision back pain.  No bowel or bladder incontinence.  No rashes.  Sleeping well.  Remainder of 12 point review systems negative unless listed above.    Past Medical History:   Diagnosis Date     Hypertension      Urinary retention        The following portions of the patient's history were reviewed and updated as appropriate: allergies, current medications, past family history, past medical history, past social history, past surgical history and problem list.      WHO 5: 25    NDI Score: 2      Objective:   Physical Exam:    Vitals:    05/15/17 0748   BP: 133/69   Pulse: 63       General:  Well-appearing male in no acute distress.  Pleasant, cooperative, and interactive throughout the examination and interview.  CV: No lower extremity edema on inspection or paltation.  Lymphatics: No cervical lymphadenopathy palpated.  Eyes: sclera clear.  Skin: No rashes or lesions seen over the head/neck, hairline, arms, legs, trunk.  Respirations unlabored.  MSK: Gait is normal.  Able to heel-toe walk without difficulty.  Negative Romberg.  Spine: normal AP curves of the C, T, and L spine.  Mild decreased rotation of the cervical spine left mildly more affected than right.  Mildly decreased flexion-extension of the lumbar spine.  Palpation: Mild hypertonic tissue textures of the left greater than right upper trapezius.  Extremities: Full range of motion of the shoulders in abduction, elbows, and wrists with no effusions or tenderness to palpation.  Negative arm drop, empty can, and Speed's test bilaterally.     Full range of motion of the  knees, and ankles from a seated position with no effusions or tenderness to palpation. No hypermobility of the upper or lower extremities.  Neurologic exam: Mental status: Patient is alert and oriented with normal affect.  Attention, knowledge, memory, and language are intact.  Normal coordination throughout the examination.  Reflexes are 2+ and symmetric biceps, triceps,  brachioradialis, patellar, and 1+ Achilles with Negative Thien's.  Sensation is intact to light touch throughout the upper and lower extremities bilaterally.  Manual muscle testing reveals 5 out of 5 strength in the shoulder abductors, elbow flexors/extensors, wrist extensors, interosseous, and finger flexors; lower extremity strength appears grossly normal.   Normal muscle bulk and tone in the arms and legs.  Positive Spurling's test left.

## 2021-06-11 NOTE — TELEPHONE ENCOUNTER
Refill Approved    Rx renewed per Medication Renewal Policy. Medication was last renewed on 7/20/20.    Lashae Abdul, [facility] Triage/Med Refill 8/31/2020     Requested Prescriptions   Pending Prescriptions Disp Refills     amLODIPine (NORVASC) 5 MG tablet [Pharmacy Med Name: AMLODIPINE BESYLATE 5MG TABLETS] 90 tablet 0     Sig: TAKE 1 TABLET BY MOUTH DAILY       Calcium-Channel Blockers Protocol Passed - 8/28/2020 10:09 AM        Passed - PCP or prescribing provider visit in past 12 months or next 3 months     Last office visit with prescriber/PCP: 11/13/2018 Fan Contreras MD OR same dept: Visit date not found OR same specialty: 11/13/2018 Fan Contreras MD  Last physical: 7/20/2020 Last MTM visit: Visit date not found   Next visit within 3 mo: Visit date not found  Next physical within 3 mo: Visit date not found  Prescriber OR PCP: Fan Contreras MD  Last diagnosis associated with med order: 1. Hypertension  - amLODIPine (NORVASC) 5 MG tablet [Pharmacy Med Name: AMLODIPINE BESYLATE 5MG TABLETS]; TAKE 1 TABLET BY MOUTH DAILY  Dispense: 90 tablet; Refill: 0    If protocol passes may refill for 12 months if within 3 months of last provider visit (or a total of 15 months).             Passed - Blood pressure filed in past 12 months     BP Readings from Last 1 Encounters:   07/20/20 124/60

## 2021-06-12 NOTE — PROGRESS NOTES
CHIEF COMPLAINT: Foster Enriquez had concerns including Bloated and Rash.    Nikolski: 1.............. had concerns including Bloated and Rash.    1. Abdominal pressure    2. Flatulence/gas pain/belching    3. Neoplasm of uncertain behavior of skin      No problem-specific Assessment & Plan notes found for this encounter.      CC:              Pt states being bloated     What's it like:       Pt states being bloated and feels pressure in chest. Pt states feels like he can't burp.   How long is it ongoing:      Ongoing for 2-3 weeks   What makes it worse :   Worst after pt eats.      What makes it better:      Pt states taking gas-x and omeprazole, seems to help a little bit.   Not necessarily exertional  0/10-10/10:Pain/Intesity    0  He has been working at the [employer] and has not had many symptoms has been doing stretching of the neck for left radiculopathy  Other things that help shot of carmen    Scribes he was at [employer] shortly after he ate he felt discomfort like a pressure build up he has to release somewhat better after he took Gas-X about 85% better  Bowel movements have been okay  Takes daily Metamucil as well as stool softener  Nothing to change his medications or his routine      Associated Sx:   Pt states skin issue on lower right cheek. Pt states possible muscle strain in right chest. Feels muscle pulled when pt takes a deep breath.       Spot right cheek hurts to touch slight area of skin that is blanched and skin damage    SUBJECTIVE:  Foster Enriquez is a 81 y.o. male    Past Medical History:   Diagnosis Date     Hypertension      Urinary retention      Past Surgical History:   Procedure Laterality Date     [place] APPENDECTOMY      Description: Appendectomy;  Recorded: 03/02/2012;     PROSTATE SURGERY       Latex; Nitroglycerin; Sulfa (sulfonamide antibiotics); and Ciprofloxacin  Current Outpatient Prescriptions   Medication Sig Dispense Refill     amLODIPine (NORVASC) 5 MG tablet TAKE 1 TABLET BY  MOUTH EVERY DAY 90 tablet 3     aspirin 81 MG EC tablet Take 81 mg by mouth daily.       dutasteride (AVODART) 0.5 mg capsule TAKE 1 CAPSULE BY MOUTH DAILY 90 capsule 3     hydrocortisone (ANUSOL-HC) 2.5 % rectal cream Insert into the rectum 2 (two) times a day. 30 g 3     omeprazole (PRILOSEC) 20 MG capsule TAKE 1 CAPSULE BY MOUTH EVERY DAY 90 capsule 3     psyllium (METAMUCIL) 0.52 gram capsule Take by mouth.       psyllium seed, sugar, (METAMUCIL) Powd 28.3       No current facility-administered medications for this visit.      Family History   Problem Relation Age of Onset     Arthritis Mother      Colon cancer Mother      Hypertension Mother      Cancer Brother      BLADDER     Hypertension Brother      Cancer Daughter      Social History     Social History     Marital status:      Spouse name: N/A     Number of children: N/A     Years of education: N/A     Social History Main Topics     Smoking status: Former Smoker     Smokeless tobacco: Never Used     Alcohol use No     Drug use: No     Sexual activity: Yes     Partners: Female     Other Topics Concern     None     Social History Narrative     Patient Active Problem List   Diagnosis     Cataract     Urinary Tract Infection     Acute Urinary Retention     Skin Abscess     Vitamin B12 Deficiency     Dyslipidemia     Benign non-nodular prostatic hyperplasia with lower urinary tract symptoms     Actinic Keratosis     Abnormal Blood Chemistry     Dizziness     Benign Adenomatous Polyp Of The Large Intestine     Benign Hypertensive Heart Disease With Congestive Heart Failure     Essential Hypertension     Idiopathic Urticaria     GERD (gastroesophageal reflux disease)     Vitamin D deficiency     Effusion of right knee                                              SOCIAL: He  reports that he has quit smoking. He has never used smokeless tobacco. He reports that he does not drink alcohol or use illicit drugs.    REVIEW OF SYSTEMS:     FAMILY HISTORY NOT  PERTINENT TO CHIEF COMPLAINT OR PRESENTING PROBLEM  Review of systems otherwise negative as requested from patient, except   Positive ROS outlined and discussed in [place].    OBJECTIVE:  /60 (Patient Site: Right Arm, Patient Position: Sitting, Cuff Size: Adult Regular)  Pulse 62  Resp 16  Wt 200 lb 4 oz (90.8 kg)  SpO2 97%  BMI 28.94 kg/m2    GENERAL:     No acute distress.   Alert and oriented X 3         Physical:    Oropharynx is clear no cervical or subclavicular nodes  Lungs are clear  Cardiac S1-S2 exacerbation will murmur  Abdomen soft nondistended hyperactive bowel sounds no rebound or guarding no right upper quadrant tenderness no Barnett sign  Femoral pulses no appreciable femoral mass  Lower extremities without edema  Difficulty rolling over because of his back    Left hemidiaphragm elevated    ASSESSMENT & PLAN      Foster was seen today for bloated and rash.    Diagnoses and all orders for this visit:    Abdominal pressure  -     XR Chest PA and Lateral; Future  -     Electrocardiogram Perform and Read  -     Ambulatory referral to Gastroenterology    Flatulence/gas pain/belching  -     XR Chest PA and Lateral; Future  -     Electrocardiogram Perform and Read  -     Ambulatory referral to Gastroenterology    Neoplasm of uncertain behavior of skin  -     Ambulatory referral to Dermatology        Return if symptoms worsen or fail to improve.       Florajen  Over there counter Probiotic  CHew foods thoroughly  Worsening symptoms come in for reevaluation  Ensure that bowels are moving regularly  CT Chest to eval for diaphragm Hernia or Mass  Anticipatory Guidance and Symptomatic Cares Discussed   Advised to call back directly if there are further questions, or if these symptoms fail to improve as anticipated or worsen.  Return to clinic if patient has a clinical concern that warrants an exam.        25  Min Total Time, > 50% counseling and coordination of Care    Fan Contreras MD  Family  [facility] [id number]  [phone]

## 2021-06-15 NOTE — PROGRESS NOTES
"    Assessment and Plan:   Abdominal pain more of a bloating happens on and off is been an issue he had gone to the specialist started on omeprazole actually helped \"his acid reflux he had less bloating but still continue to have some constipation symptoms his developed some hiccups and discomfort in the lower as well as upper abdomen not specifically related to eating food has been somewhat relieved with use of laxatives he continues to take Metamucil daily  Urine is been quiesced sent no nocturia no recent infections does not catheterize anymore sees Dr. Munoz  It with annual wellness questions reviewed      Problem List Items Addressed This Visit     Vitamin D deficiency    Relevant Orders    Vitamin D, Total (25-Hydroxy)    Vitamin B12 deficiency    Relevant Orders    Vitamin B12    GERD (gastroesophageal reflux disease)    Essential Hypertension    Relevant Orders    Comprehensive Metabolic Panel    HM1(CBC and Differential)    C -Reactive Protein, High Sensitivity    Urinalysis-UC if Indicated    Thyroid Cascade    HM1 (CBC with Diff)    Dyslipidemia    Relevant Orders    Lipid Cascade    Abnormal Blood Chemistry - Primary      Other Visit Diagnoses     BPH (benign prostatic hyperplasia)        Relevant Medications    dutasteride (AVODART) 0.5 mg capsule    Well adult exam        Bloating        Relevant Orders    XR Abdomen Flat and Upright    Screening for prostate cancer        Relevant Orders    PSA (Prostatic-Specific Antigen), Annual Screen            The patient's current medical problems were reviewed.    I have had an Advance Directives discussion with the patient.  The following health maintenance schedule was reviewed with the patient and provided in printed form in the after visit summary:   Health Maintenance   Topic Date Due     FALL RISK ASSESSMENT  02/03/2018     TD 18+ HE  07/01/2018     ADVANCE DIRECTIVES DISCUSSED WITH PATIENT  03/15/2021     PNEUMOCOCCAL POLYSACCHARIDE VACCINE AGE 65 AND " OVER  Completed     INFLUENZA VACCINE RULE BASED  Completed     PNEUMOCOCCAL CONJUGATE VACCINE FOR ADULTS (PCV13 OR PREVNAR)  Completed     ZOSTER VACCINE  Completed        Subjective:   Chief Complaint: Foster Enriquez is an 82 y.o. male here for an Annual Wellness visit.   HPI:  See above      Review of Systems:  Above    Please see above.  The rest of the review of systems are negative for all systems.    Patient Care Team:  Fan Contreras MD as PCP - General     Patient Active Problem List   Diagnosis     Cataract     Acute Urinary Retention     Skin Abscess     Vitamin B12 deficiency     Dyslipidemia     Benign non-nodular prostatic hyperplasia with lower urinary tract symptoms     Actinic Keratosis     Abnormal Blood Chemistry     Dizziness     Benign Adenomatous Polyp Of The Large Intestine     Benign Hypertensive Heart Disease With Congestive Heart Failure     Essential Hypertension     Idiopathic Urticaria     GERD (gastroesophageal reflux disease)     Vitamin D deficiency     Effusion of right knee     Past Medical History:   Diagnosis Date     Hypertension      Urinary retention       Past Surgical History:   Procedure Laterality Date     [place] APPENDECTOMY      Description: Appendectomy;  Recorded: 03/02/2012;     PROSTATE SURGERY        Family History   Problem Relation Age of Onset     Arthritis Mother      Colon cancer Mother      Hypertension Mother      Cancer Brother      BLADDER     Hypertension Brother      Cancer Daughter       Social History     Social History     Marital status:      Spouse name: N/A     Number of children: N/A     Years of education: N/A     Occupational History     Not on file.     Social History Main Topics     Smoking status: Former Smoker     Smokeless tobacco: Never Used     Alcohol use No     Drug use: No     Sexual activity: Yes     Partners: Female     Other Topics Concern     Not on file     Social History Narrative      Current Outpatient Prescriptions  "  Medication Sig Dispense Refill     aspirin 81 MG EC tablet Take 81 mg by mouth daily.       dutasteride (AVODART) 0.5 mg capsule TAKE 1 CAPSULE BY MOUTH DAILY 90 capsule 3     hydrocortisone (ANUSOL-HC) 2.5 % rectal cream Insert into the rectum 2 (two) times a day. 30 g 3     omeprazole (PRILOSEC) 20 MG capsule TAKE 1 CAPSULE BY MOUTH EVERY DAY 90 capsule 3     psyllium seed, sugar, (METAMUCIL) Powd 28.3       amLODIPine (NORVASC) 5 MG tablet TAKE 1 TABLET BY MOUTH EVERY DAY 90 tablet 3     psyllium (METAMUCIL) 0.52 gram capsule Take by mouth.       No current facility-administered medications for this visit.       Objective:   Vital Signs:   Visit Vitals     /72 (Patient Site: Right Arm, Patient Position: Sitting, Cuff Size: Adult Large)     Pulse 72     Temp 97.8  F (36.6  C)     Resp 16     Ht 5' 9.75\" (1.772 m)     Wt 206 lb 8 oz (93.7 kg)     BMI 29.84 kg/m2        VisionScreening:  No exam data present     PHYSICAL EXAM  Left cataract extracted right cataract minimal  Carotid pulses palpable without bruits  Tongue  Lungs are clear  Cardiac S1-S2 regular sinus no appreciable murmur  Abdomen soft nontender eventration diastasis recti but no appreciable hernia  Bilateral descended testicles noncircumcised phallus no inguinal hernia  No dysplastic nevi of the back legs  No lower extremity edema  No tremulousness  Absent left left partial index finger      Assessment Results 2/6/2018   Activities of Daily Living No help needed   Instrumental Activities of Daily Living No help needed   Mini Cog Total Score 5   Some recent data might be hidden     A Mini-Cog score of 0-2 suggests the possibility of dementia, score of 3-5 suggests no dementia    Identified Health Risks:     He is at risk for lack of exercise and has been provided with information to increase physical activity for the benefit of his well-being.  The patient was counseled and encouraged to consider modifying their diet and eating habits. He was " provided with information on recommended healthy diet options.  Information regarding advance directives (living fonseca), including where he can download the appropriate form, was provided to the patient via the AVS.

## 2021-06-17 NOTE — PATIENT INSTRUCTIONS - HE
Patient Instructions by Fan Contreras MD at 4/4/2019  9:20 AM     Author: Fan Contreras MD Service: -- Author Type: Physician    Filed: 4/4/2019  1:46 PM Encounter Date: 4/4/2019 Status: Addendum    : Fan Contreras MD (Physician)    Related Notes: Original Note by Fan Contreras MD (Physician) filed at 4/4/2019 10:22 AM       Or reflux and gas consider certain foods might be triggering these    I will give you the typical things we do for reflux lifestyle changes  There are also supplements that are possibly helpful for reflux  One that might be helpful for you is slippery elm  Also fermented foods such is Kombucha or yogurts    Gas can be produced by any food but some of the typical offenders include fiber foods and dairy    GERD  Head of Bed Elevate to 30 Degrees  Weight Reduction  Shoot for BMI <25 or Waist Hip Ratio less 1   NO Tobacco worsens reflux  Avoid Tight Clothes  No Food 3 hours Prior to Bed  No Food within 1 Hour Exercise  Small Portions/ Chew well!  Avoid Triggers:  Chocolate/Mint/Caffeine/Alcohol/Citrus/Garlic/ Onion / Carbonated    Natural RX---- GERD   Melatonin  3- 10 mg at bedtime support lower esophageal sphincter  Alginates  1000 mg Daily  Found in OTC>>> Gaviscon Advance  Aloe Vera (not Latex brand) 10 ml Daily  DGL (Licorice)  10:1  400mg Standard   + Glycine 50 mg   DGL range 350mg - 800 mg pre - meal  Iberogast (prprietary herb blend) 1 ml 2-3 x Daily  Tea Bag Licorice Tea  Sips throughout the Day  Slippery Elm   Probiotics Lactobicillus/Bifidus Species          Patient Education     Exercise for a Healthier Heart  You may wonder how you can improve the health of your heart. If youre thinking about exercise, youre on the right track. You dont need to become an athlete, but you do need a certain amount of brisk exercise to help strengthen your heart. If you have been diagnosed with a heart condition, your doctor may recommend exercise to help stabilize your condition. To  help make exercise a habit, choose safe, fun activities.       Be sure to check with your health care provider before starting an exercise program.    Why exercise?  Exercising regularly offers many healthy rewards. It can help you do all of the following:    Improve your blood cholesterol levels to help prevent further heart trouble    Lower your blood pressure to help prevent a stroke or heart attack    Control diabetes, or reduce your risk of getting this disease    Improve your heart and lung function    Reach and maintain a healthy weight    Make your muscles stronger and more limber so you can stay active    Prevent falls and fractures by slowing the loss of bone mass (osteoporosis)    Manage stress better  Exercise tips  Ease into your routine. Set small goals. Then build on them.  Exercise on most days. Aim for a total of 150 or more minutes of moderate to  vigorous intensity activity each week. Consider 40 minutes, 3 to 4 times a week. For best results, activity should last for 40 minutes on average. It is OK to work up to the 40 minute period over time. Examples of moderate-intensity activity is walking one mile in 15 minutes or 30 to 45 minutes of yard work.  Step up your daily activity level. Along with your exercise program, try being more active throughout the day. Walk instead of drive. Do more household tasks or yard work.  Choose one or more activities you enjoy. Walking is one of the easiest things you can do. You can also try swimming, riding a bike, or taking an exercise class.  Stop exercising and call your doctor if you:    Have chest pain or feel dizzy or lightheaded    Feel burning, tightness, pressure, or heaviness in your chest, neck, shoulders, back, or arms    Have unusual shortness of breath    Have increased joint or muscle pain    Have palpitations or an irregular heartbeat      0795-8614 [employer]. [address] [place] [id number]. All rights reserved. This  information is not intended as a substitute for professional medical care. Always follow your healthcare professional's instructions.         Patient Education   Understanding USDA MyPlate  The USDA (US Department of Agriculture) has guidelines to help you make healthy food choices. These are called MyPlate. MyPlate shows the food groups that make up healthy meals using the image of a place setting. Before you eat, think about the healthiest choices for what to put onto your plate or into your cup or bowl. To learn more about building a healthy plate, visit www.choosemyplate.gov.       The Food Groups    Fruits: Any fruit or 100% fruit juice counts as part of the Fruit Group. Fruits may be fresh, canned, frozen, or dried, and may be whole, cut-up, or pureed. Make half your plate fruits and vegetables.    Vegetables: Any vegetable or 100% vegetable juice counts as a member of the Vegetable Group. Vegetables may be fresh, frozen, canned, or dried. They can be served raw or cooked and may be whole, cut-up, or mashed. Make half your plate fruits and vegetables.     Grains: All foods made from grains are part of the Grains Group. These include wheat, rice, oats, cornmeal, and barley such as bread, pasta, oatmeal, cereal, tortillas, and grits. Grains should be no more than a quarter of your plate. At least half of your grains should be whole grains.    Protein: This group includes meat, poultry, seafood, beans and peas, eggs, processed soy products (like tofu), nuts (including nut butters), and seeds. Make protein choices no more than a quarter of your plate. Meat and poultry choices should be lean or low fat.    Dairy: All fluid milk products and foods made from milk that contain calcium, like yogurt and cheese are part of the Dairy Group. (Foods that have little calcium, such as cream, butter, and cream cheese, are not part of the group.) Most dairy choices should be low-fat or fat-free.    Oils: These are fats that are  liquid at room temperature. They include canola, corn, olive, soybean, and sunflower oil. Foods that are mainly oil include mayonnaise, certain salad dressings, and soft margarines. You should have only 5 to 7 teaspoons of oils a day. You probably already get this much from the food you eat.  Use [employer] to Help Build Your Meals  The SuperTracker can help you plan and track your meals and activity. You can look up individual foods to see or compare their nutritional value. You can get guidelines for what and how much you should eat. You can compare your food choices. And you can assess personal physical activities and see ways you can improve. Go to www.[employer].gov/supertracker/.    3327-9981 [employer]. [address] [place] [address], [place] [id number]. All rights reserved. This information is not intended as a substitute for professional medical care. Always follow your healthcare professional's instructions.           Patient Education   Understanding Advance Care Planning  Advance care planning is the process of deciding ones own future medical care. It helps ensure that if you cant speak for yourself, your wishes can still be carried out. The plan is a series of legal documents that note a persons wishes. The documents vary by state. Advance care planning may be done when a person has a serious illness that is expected to get worse. It may be done before major surgery. And it can help you and your family be prepared in case of a major illness or injury. Advance care planning helps with making decisions at these times.       A health care proxy is a person who acts as the voice of a patient when the patient cant speak for himself or herself. The name of this role varies by state. It may be called a Durable Medical Power of  or Durable Power of  for Healthcare. It may be called an agent, surrogate, or advocate. Or it may be called a representative or decision maker. It is an  official duty that is identified by a legal document. The document also varies by state.    Why Is Advance Care Planning Important?  If a person communicates their healthcare wishes:    They will be given medical care that matches their values and goals.    Their family members will not be forced to make decisions in a crisis with no guidance.  Creating a Plan  Making an advance care plan is often done in 3 steps:    Thinking about ones wishes. To create an advance care plan, you should think about what kind of medical treatment you would want if you lose the ability to communicate. Are there any situations in which you would refuse or stop treatment? Are there therapies you would want or not want? And whom do you want to make decisions for you? There are many places to learn more about how to plan for your care. Ask your doctor or  for resources.    Picking a health care proxy. This means choosing a trusted person to speak for you only when you cant speak for yourself. When you cannot make medical decisions, your proxy makes sure the instructions in your advance care plan are followed. A proxy does not make decisions based on his or her own opinions. They must put aside those opinions and values if needed, and carry out your wishes.    Filling out the legal documents. There are several kinds of legal documents for advance care planning. Each one tells health care providers your wishes. The documents may vary by state. They must be signed and may need to be witnessed or notarized. You can cancel or change them whenever you wish. Depending on your state, the documents may include a Healthcare Proxy form, Living Will, Durable Medical Power of , Advance Directive, or others.  The Familys Role  The best help a family can give is to support their loved ones wishes. Open and honest communication is vital. Family should express any concerns they have about the patients choices while the patient can  still make decisions.    6202-1583 [employer]. [address] [place] [address], [place] [address]. All rights reserved. This information is not intended as a substitute for professional medical care. Always follow your healthcare professional's instructions.         Also, [facility] offers a free, downloadable health care directive that allows you to share your treatment choices and personal preferences if you cannot communicate your wishes. It also allows you to appoint another person (called a health care agent) to make health care decisions if you are unable to do so. You can download an advance directive by going here: [email or web address]     Patient Education   Personalized Prevention Plan  You are due for the preventive services outlined below.  Your care team is available to assist you in scheduling these services.  If you have already completed any of these items, please share that information with your care team to update in your medical record.  Health Maintenance   Topic Date Due   ? ZOSTER VACCINES (2 of 3) 03/01/2013   ? TD 18+ HE  07/01/2018   ? FALL RISK ASSESSMENT  02/06/2019   ? ADVANCE DIRECTIVES DISCUSSED WITH PATIENT  02/06/2023   ? PNEUMOCOCCAL POLYSACCHARIDE VACCINE AGE 65 AND OVER  Completed   ? INFLUENZA VACCINE RULE BASED  Completed   ? PNEUMOCOCCAL CONJUGATE VACCINE FOR ADULTS (PCV13 OR PREVNAR)  Completed

## 2021-06-18 NOTE — PATIENT INSTRUCTIONS - HE
Patient Instructions by Fan Contreras MD at 7/20/2020  8:00 AM     Author: Fan Contreras MD Service: -- Author Type: Physician    Filed: 7/20/2020  8:34 AM Encounter Date: 7/20/2020 Status: Addendum    : Fan Contreras MD (Physician)    Related Notes: Original Note by Fan Contreras MD (Physician) filed at 7/20/2020  8:30 AM         Patient Education     Exercise for a Healthier Heart  You may wonder how you can improve the health of your heart. If youre thinking about exercise, youre on the right track. You dont need to become an athlete, but you do need a certain amount of brisk exercise to help strengthen your heart. If you have been diagnosed with a heart condition, your doctor may recommend exercise to help stabilize your condition. To help make exercise a habit, choose safe, fun activities.       Be sure to check with your health care provider before starting an exercise program.    Why exercise?  Exercising regularly offers many healthy rewards. It can help you do all of the following:    Improve your blood cholesterol levels to help prevent further heart trouble    Lower your blood pressure to help prevent a stroke or heart attack    Control diabetes, or reduce your risk of getting this disease    Improve your heart and lung function    Reach and maintain a healthy weight    Make your muscles stronger and more limber so you can stay active    Prevent falls and fractures by slowing the loss of bone mass (osteoporosis)    Manage stress better  Exercise tips  Ease into your routine. Set small goals. Then build on them.  Exercise on most days. Aim for a total of 150 or more minutes of moderate to  vigorous intensity activity each week. Consider 40 minutes, 3 to 4 times a week. For best results, activity should last for 40 minutes on average. It is OK to work up to the 40 minute period over time. Examples of moderate-intensity activity is walking one mile in 15 minutes or 30 to 45 minutes of  yard work.  Step up your daily activity level. Along with your exercise program, try being more active throughout the day. Walk instead of drive. Do more household tasks or yard work.  Choose one or more activities you enjoy. Walking is one of the easiest things you can do. You can also try swimming, riding a bike, or taking an exercise class.  Stop exercising and call your doctor if you:    Have chest pain or feel dizzy or lightheaded    Feel burning, tightness, pressure, or heaviness in your chest, neck, shoulders, back, or arms    Have unusual shortness of breath    Have increased joint or muscle pain    Have palpitations or an irregular heartbeat      2312-2595 [employer]. [address] [place] [address], [place] [address]. All rights reserved. This information is not intended as a substitute for professional medical care. Always follow your healthcare professional's instructions.         Patient Education   Understanding [employer] MyPlate  The USDA (US Department of Agriculture) has guidelines to help you make healthy food choices. These are called MyPlate. MyPlate shows the food groups that make up healthy meals using the image of a place setting. Before you eat, think about the healthiest choices for what to put onto your plate or into your cup or bowl. To learn more about building a healthy plate, visit www.choosemyplate.gov.       The Food Groups    Fruits: Any fruit or 100% fruit juice counts as part of the Fruit Group. Fruits may be fresh, canned, frozen, or dried, and may be whole, cut-up, or pureed. Make half your plate fruits and vegetables.    Vegetables: Any vegetable or 100% vegetable juice counts as a member of the Vegetable Group. Vegetables may be fresh, frozen, canned, or dried. They can be served raw or cooked and may be whole, cut-up, or mashed. Make half your plate fruits and vegetables.     Grains: All foods made from grains are part of the Grains Group. These include wheat, rice, oats,  cornmeal, and barley such as bread, pasta, oatmeal, cereal, tortillas, and grits. Grains should be no more than a quarter of your plate. At least half of your grains should be whole grains.    Protein: This group includes meat, poultry, seafood, beans and peas, eggs, processed soy products (like tofu), nuts (including nut butters), and seeds. Make protein choices no more than a quarter of your plate. Meat and poultry choices should be lean or low fat.    Dairy: All fluid milk products and foods made from milk that contain calcium, like yogurt and cheese are part of the Dairy Group. (Foods that have little calcium, such as cream, butter, and cream cheese, are not part of the group.) Most dairy choices should be low-fat or fat-free.    Oils: These are fats that are liquid at room temperature. They include canola, corn, olive, soybean, and sunflower oil. Foods that are mainly oil include mayonnaise, certain salad dressings, and soft margarines. You should have only 5 to 7 teaspoons of oils a day. You probably already get this much from the food you eat.  Use [employer] to Help Build Your Meals  The SuperTracker can help you plan and track your meals and activity. You can look up individual foods to see or compare their nutritional value. You can get guidelines for what and how much you should eat. You can compare your food choices. And you can assess personal physical activities and see ways you can improve. Go to www.choosemyplate.gov/supertracker/.    7314-3654 [employer]. [address] [place] [address], [place] [address]. All rights reserved. This information is not intended as a substitute for professional medical care. Always follow your healthcare professional's instructions.           Patient Education   Understanding Advance Care Planning  Advance care planning is the process of deciding ones own future medical care. It helps ensure that if you cant speak for yourself, your wishes can still be carried  out. The plan is a series of legal documents that note a persons wishes. The documents vary by state. Advance care planning may be done when a person has a serious illness that is expected to get worse. It may be done before major surgery. And it can help you and your family be prepared in case of a major illness or injury. Advance care planning helps with making decisions at these times.       A health care proxy is a person who acts as the voice of a patient when the patient cant speak for himself or herself. The name of this role varies by state. It may be called a Durable Medical Power of  or Durable Power of  for Healthcare. It may be called an agent, surrogate, or advocate. Or it may be called a representative or decision maker. It is an official duty that is identified by a legal document. The document also varies by state.    Why Is Advance Care Planning Important?  If a person communicates their healthcare wishes:    They will be given medical care that matches their values and goals.    Their family members will not be forced to make decisions in a crisis with no guidance.  Creating a Plan  Making an advance care plan is often done in 3 steps:    Thinking about ones wishes. To create an advance care plan, you should think about what kind of medical treatment you would want if you lose the ability to communicate. Are there any situations in which you would refuse or stop treatment? Are there therapies you would want or not want? And whom do you want to make decisions for you? There are many places to learn more about how to plan for your care. Ask your doctor or  for resources.    Picking a health care proxy. This means choosing a trusted person to speak for you only when you cant speak for yourself. When you cannot make medical decisions, your proxy makes sure the instructions in your advance care plan are followed. A proxy does not make decisions based on his or her own  opinions. They must put aside those opinions and values if needed, and carry out your wishes.    Filling out the legal documents. There are several kinds of legal documents for advance care planning. Each one tells health care providers your wishes. The documents may vary by state. They must be signed and may need to be witnessed or notarized. You can cancel or change them whenever you wish. Depending on your state, the documents may include a Healthcare Proxy form, Living Will, Durable Medical Power of , Advance Directive, or others.  The Familys Role  The best help a family can give is to support their loved ones wishes. Open and honest communication is vital. Family should express any concerns they have about the patients choices while the patient can still make decisions.    2580-1975 [employer] [address] [place] [address]. All rights reserved. This information is not intended as a substitute for professional medical care. Always follow your healthcare professional's instructions.         Also, [facility] offers a free, downloadable health care directive that allows you to share your treatment choices and personal preferences if you cannot communicate your wishes. It also allows you to appoint another person (called a health care agent) to make health care decisions if you are unable to do so. You can download an advance directive by going here: [email or web address]     Patient Education   Personalized Prevention Plan  You are due for the preventive services outlined below.  Your care team is available to assist you in scheduling these services.  If you have already completed any of these items, please share that information with your care team to update in your medical record.  Health Maintenance   Topic Date Due   ? ZOSTER VACCINES (2 of 3) 03/01/2013   ? TD 18+ HE  07/01/2018   ? MEDICARE ANNUAL WELLNESS VISIT  02/06/2019   ?  FALL RISK ASSESSMENT  04/04/2020   ? INFLUENZA VACCINE RULE BASED (1) 08/01/2020   ? ADVANCE CARE PLANNING  04/04/2024   ? PNEUMOCOCCAL IMMUNIZATION 65+ LOW/MEDIUM RISK  Completed   ? HEPATITIS B VACCINES  Aged Out           Follow up with Dr Munoz as scheduled    Skin May blister     Say Hi to Yessenia!    Be mindful of the Covid thing and be mindful of your interactions and risks     Rik

## 2021-06-19 NOTE — LETTER
Letter by Fan Contreras MD at      Author: Fan Contreras MD Service: -- Author Type: --    Filed:  Encounter Date: 4/4/2019 Status: (Other)         April 4, 2019     Patient: Foster Enriquez   YOB: 1935   Date of Visit: 4/4/2019       To Whom it May Concern:    Foster Enriquez was seen in my clinic on 4/4/2019.    Preoperative Exam    Scheduled Procedure: YAG  Laser Capsulotomy   Surgery Date: 4/8/2019   Surgery Location: [facility]   [place]  [place]   Surgeon:  Eye Consultant    Assessment/Plan:     Problem List Items Addressed This Visit     Vitamin D deficiency - Primary    Vitamin B12 deficiency    Relevant Orders    Vitamin B12    GERD (gastroesophageal reflux disease)    Benign non-nodular prostatic hyperplasia with lower urinary tract symptoms    Actinic Keratosis    Abnormal Blood Chemistry    Relevant Orders    Comprehensive Metabolic Panel    Magnesium, Red Blood Cell    HM1(CBC and Differential)    Lipid Cascade    Thyroid [place]    HM1 (CBC with Diff)      Other Visit Diagnoses     Blurry vision, left eye        Well adult exam        Relevant Orders    Comprehensive Metabolic Panel    Vitamin B12    Magnesium, Red Blood Cell    HM1(CBC and Differential)    Lipid Cascade    Thyroid [place]    HM1 (CBC with Diff)            Surgical Procedure Risk: Low (reported cardiac risk generally < 1%)  Have you had prior anesthesia?: Yes  Have you or any family members had a previous anesthesia reaction:  No  Do you or any family members have a history of a clotting or bleeding disorder?: No  Cardiac Risk Assessment: no increased risk for major cardiac complications    Patient approved for surgery with general or local anesthesia.   Patient may continue all of his medications  Inform the surgical team if something should change in his clinical condition  Please Note:  none    Functional Status: Independent  Patient plans to recover at home with family.     Subjective:     "  Foster Enriquez is a 83 y.o. male who presents for a preoperative consultation.  Surgery 2017   Since developed Eye Left Blurry   \"Skin grows over the eye\"  Needs laser       All other systems reviewed and are negative, other than those listed in the HPI.    Pertinent History  Do you have difficulty breathing or chest pain after walking up a flight of stairs: No  History of obstructive sleep apnea: No  Steroid use in the last 6 months: No  Frequent Aspirin/NSAID use: Yes: baby aspirin daily  Prior Blood Transfusion: No  Prior Blood Transfusion Reaction: No  If for some reason prior to, during or after the procedure, if it is medically indicated, would you be willing to have a blood transfusion?:  There is no transfusion refusal.    Current Outpatient Medications   Medication Sig Dispense Refill   ? amLODIPine (NORVASC) 5 MG tablet TAKE 1 TABLET(5 MG) BY MOUTH DAILY 90 tablet 3   ? aspirin 81 MG EC tablet Take 81 mg by mouth daily.     ? cholecalciferol, vitamin D3, 1,000 unit tablet Take 1,000 Units by mouth daily.     ? dutasteride (AVODART) 0.5 mg capsule TAKE 1 CAPSULE BY MOUTH DAILY 90 capsule 3   ? hydrocortisone (ANUSOL-HC) 2.5 % rectal cream Insert into the rectum 2 (two) times a day. 30 g 3   ? omeprazole (PRILOSEC) 20 MG capsule TAKE 1 CAPSULE BY MOUTH EVERY DAY 90 capsule 2     No current facility-administered medications for this visit.         Allergies   Allergen Reactions   ? Latex    ? Nitroglycerin      Annotation: Severe reaction- hypotension     ? Sulfa (Sulfonamide Antibiotics)    ? Ciprofloxacin Rash       Patient Active Problem List   Diagnosis   ? Cataract   ? Vitamin B12 deficiency   ? Dyslipidemia   ? Benign non-nodular prostatic hyperplasia with lower urinary tract symptoms   ? Actinic Keratosis   ? Abnormal Blood Chemistry   ? Benign Adenomatous Polyp Of The Large Intestine   ? Benign Hypertensive Heart Disease With Congestive Heart Failure   ? Essential Hypertension   ? Idiopathic " Urticaria   ? GERD (gastroesophageal reflux disease)   ? Vitamin D deficiency   ? Effusion of right knee   ? Bowel habit changes   ? Bloating symptom   ? Flatulence, eructation and gas pain       Past Medical History:   Diagnosis Date   ? Hypertension    ? Urinary retention        Past Surgical History:   Procedure Laterality Date   ? SD APPENDECTOMY      Description: Appendectomy;  Recorded: 03/02/2012;   ? PROSTATE SURGERY         Social History     Socioeconomic History   ? Marital status:      Spouse name: Not on file   ? Number of children: Not on file   ? Years of education: Not on file   ? Highest education level: Not on file   Occupational History   ? Not on file   Social Needs   ? Financial resource strain: Not on file   ? Food insecurity:     Worry: Not on file     Inability: Not on file   ? Transportation needs:     Medical: Not on file     Non-medical: Not on file   Tobacco Use   ? Smoking status: Former Smoker   ? Smokeless tobacco: Never Used   Substance and Sexual Activity   ? Alcohol use: No   ? Drug use: No   ? Sexual activity: Yes     Partners: Female   Lifestyle   ? Physical activity:     Days per week: Not on file     Minutes per session: Not on file   ? Stress: Not on file   Relationships   ? Social connections:     Talks on phone: Not on file     Gets together: Not on file     Attends Hindu service: Not on file     Active member of club or organization: Not on file     Attends meetings of clubs or organizations: Not on file     Relationship status: Not on file   ? Intimate partner violence:     Fear of current or ex partner: Not on file     Emotionally abused: Not on file     Physically abused: Not on file     Forced sexual activity: Not on file   Other Topics Concern   ? Not on file   Social History Narrative   ? Not on file       Patient Care Team:  Fan Contreras MD as PCP - General          Objective:     Vitals:    04/04/19 0941   BP: 136/80   Pulse: 68   Weight: 201 lb 12 oz  "(91.5 kg)   Height: 5' 9.5\" (1.765 m)         Physical Exam:      Physical:  General Appearance: Healthy-appearingy.   Head:  No deformity  Eyes: Sclerae white, pupils equal and reactive, extra ocular movements intact left cataract extracted  Ears: Well-positioned, well-formed pinnae; TM pearly white, translucent, no bulging   Nose: Clear, normal mucosa no drainage or crusting  Throat: Lips, tongue, and mucosa are moist, pink and intact; tongue no thrush oral pharynx no injection or lesions some missing teeth recent extraction of the left upper  Neck: Supple, symmetric ROM no nodes   No carotid Buits  Chest: Lungs clear to auscultation, no retractions  Heart: Regular rate & rhythm, S1 S2, no murmur  Abdomen: Soft, non-tender, no masses; umbilical area normal   Pulses: Equal femoral pulses  Extremities: Well-perfused, warm and dry, No Edema  Palpable Pulses Bilateral  Neuro: Easily aroused good tone NO tremor   Skin tinea keratosis right flank freeze thaw method frozen for 1 minute      If you have any questions or concerns, please don't hesitate to call.    Sincerely,         Electronically signed by Fan Contreras MD       "

## 2021-06-19 NOTE — LETTER
Letter by Fan Contreras MD at      Author: Fan Contreras MD Service: -- Author Type: --    Filed:  Encounter Date: 4/12/2019 Status: (Other)         Foster Enriquez  [address] [facility] [address]             April 22, 2019         Dear Mr. Enriquez,    Below are the results from your recent visit:    Resulted Orders   Comprehensive Metabolic Panel   Result Value Ref Range    Sodium 140 136 - 145 mmol/L    Potassium 4.1 3.5 - 5.0 mmol/L    Chloride 104 98 - 107 mmol/L    CO2 25 22 - 31 mmol/L    Anion Gap, Calculation 11 5 - 18 mmol/L    Glucose 105 70 - 125 mg/dL    BUN 17 8 - 28 mg/dL    Creatinine 1.02 0.70 - 1.30 mg/dL    GFR MDRD Af Amer >60 >60 mL/min/1.73m2    GFR MDRD Non Af Amer >60 >60 mL/min/1.73m2    Bilirubin, Total 1.3 (H) 0.0 - 1.0 mg/dL    Calcium 9.5 8.5 - 10.5 mg/dL    Protein, Total 7.1 6.0 - 8.0 g/dL    Albumin 4.0 3.5 - 5.0 g/dL    Alkaline Phosphatase 64 45 - 120 U/L    AST 66 (H) 0 - 40 U/L    ALT 56 (H) 0 - 45 U/L    Narrative    Fasting Glucose reference range is 70-99 mg/dL per  American Diabetes Association (ADA) guidelines.   Vitamin B12   Result Value Ref Range    Vitamin B-12 388 213 - 816 pg/mL   Magnesium, Red Blood Cell   Result Value Ref Range    Scan Result See Scanned Report     Magnesium Red Blood Cell 4.7 3.5 - 7.1 mg/dL      Comment:          This test was developed and its performance characteristics determined by [facility]. It has not been cleared or approved by the Food and Drug Administration.    Performed by:                [email or web address]  [address] [place] [address]  [phone]   Lipid Cascade   Result Value Ref Range    Cholesterol 201 (H) <=199 mg/dL    Triglycerides 118 <=149 mg/dL    HDL Cholesterol 51 >=40 mg/dL    LDL Calculated 126 <=129 mg/dL    Patient Fasting > 8hrs? Unknown    Thyroid Cascade   Result Value Ref Range    TSH 1.48 0.30 - 5.00 uIU/mL   HM1 (CBC with Diff)   Result Value Ref Range    WBC 6.6 4.0 - 11.0 thou/uL     RBC 5.44 4.40 - 6.20 mill/uL    Hemoglobin 16.5 14.0 - 18.0 g/dL    Hematocrit 49.0 40.0 - 54.0 %    MCV 90 80 - 100 fL    MCH 30.3 27.0 - 34.0 pg    MCHC 33.7 32.0 - 36.0 g/dL    RDW 11.8 11.0 - 14.5 %    Platelets 167 140 - 440 thou/uL    MPV 7.8 7.0 - 10.0 fL    Neutrophils % 69 50 - 70 %    Lymphocytes % 21 20 - 40 %    Monocytes % 6 2 - 10 %    Eosinophils % 4 0 - 6 %    Basophils % 1 0 - 2 %    Neutrophils Absolute 4.5 2.0 - 7.7 thou/uL    Lymphocytes Absolute 1.4 0.8 - 4.4 thou/uL    Monocytes Absolute 0.4 0.0 - 0.9 thou/uL    Eosinophils Absolute 0.3 0.0 - 0.4 thou/uL    Basophils Absolute 0.1 0.0 - 0.2 thou/uL       Rambo these are acceptable, B12 is a bit low.  You could replace that with a B Complex 100 OTC.    Keep eating well and stay active.   Yessenia is your best medicine!    DanL    Please call with questions or contact us using [email or web address].    Sincerely,        Electronically signed by Fan Contreras MD

## 2021-06-19 NOTE — LETTER
Letter by Fan Contreras MD at      Author: Fan Contreras MD Service: -- Author Type: --    Filed:  Encounter Date: 4/12/2019 Status: (Other)         Foster Enriquez  [address] [facility] [address]             April 12, 2019         Dear Mr. Enriquez,    Below are the results from your recent visit:    Resulted Orders   Comprehensive Metabolic Panel   Result Value Ref Range    Sodium 140 136 - 145 mmol/L    Potassium 4.1 3.5 - 5.0 mmol/L    Chloride 104 98 - 107 mmol/L    CO2 25 22 - 31 mmol/L    Anion Gap, Calculation 11 5 - 18 mmol/L    Glucose 105 70 - 125 mg/dL    BUN 17 8 - 28 mg/dL    Creatinine 1.02 0.70 - 1.30 mg/dL    GFR MDRD Af Amer >60 >60 mL/min/1.73m2    GFR MDRD Non Af Amer >60 >60 mL/min/1.73m2    Bilirubin, Total 1.3 (H) 0.0 - 1.0 mg/dL    Calcium 9.5 8.5 - 10.5 mg/dL    Protein, Total 7.1 6.0 - 8.0 g/dL    Albumin 4.0 3.5 - 5.0 g/dL    Alkaline Phosphatase 64 45 - 120 U/L    AST 66 (H) 0 - 40 U/L    ALT 56 (H) 0 - 45 U/L    Narrative    Fasting Glucose reference range is 70-99 mg/dL per  American Diabetes Association (ADA) guidelines.   Vitamin B12   Result Value Ref Range    Vitamin B-12 388 213 - 816 pg/mL   Magnesium, Red Blood Cell   Result Value Ref Range    Scan Result See Scanned Report     Magnesium Red Blood Cell 4.7 3.5 - 7.1 mg/dL      Comment:          This test was developed and its performance characteristics determined by [facility]. It has not been cleared or approved by the Food and Drug Administration.    Performed by:                [facility]  [address] [place], [place] [address] [phone]   Lipid Cascade   Result Value Ref Range    Cholesterol 201 (H) <=199 mg/dL    Triglycerides 118 <=149 mg/dL    HDL Cholesterol 51 >=40 mg/dL    LDL Calculated 126 <=129 mg/dL    Patient Fasting > 8hrs? Unknown    Thyroid Cascade   Result Value Ref Range    TSH 1.48 0.30 - 5.00 uIU/mL   HM1 (CBC with Diff)   Result Value Ref Range    WBC 6.6 4.0 - 11.0 thou/uL     RBC 5.44 4.40 - 6.20 mill/uL    Hemoglobin 16.5 14.0 - 18.0 g/dL    Hematocrit 49.0 40.0 - 54.0 %    MCV 90 80 - 100 fL    MCH 30.3 27.0 - 34.0 pg    MCHC 33.7 32.0 - 36.0 g/dL    RDW 11.8 11.0 - 14.5 %    Platelets 167 140 - 440 thou/uL    MPV 7.8 7.0 - 10.0 fL    Neutrophils % 69 50 - 70 %    Lymphocytes % 21 20 - 40 %    Monocytes % 6 2 - 10 %    Eosinophils % 4 0 - 6 %    Basophils % 1 0 - 2 %    Neutrophils Absolute 4.5 2.0 - 7.7 thou/uL    Lymphocytes Absolute 1.4 0.8 - 4.4 thou/uL    Monocytes Absolute 0.4 0.0 - 0.9 thou/uL    Eosinophils Absolute 0.3 0.0 - 0.4 thou/uL    Basophils Absolute 0.1 0.0 - 0.2 thou/uL       THese are OK Rambo  Liver tests are a bit high.   Less sugar   More B Vitamins --- DanL  Not sure the cause of the Liver enzyme elevation.    Please call with questions or contact us using [facility].    Sincerely,        Electronically signed by Fan Contreras MD

## 2021-06-20 NOTE — LETTER
Letter by Fan Contreras MD at      Author: Fan Contreras MD Service: -- Author Type: --    Filed:  Encounter Date: 7/21/2020 Status: (Other)         Foster Enriquez  [address] [facility] [address]             July 21, 2020         Dear Mr. Enriquez,    Below are the results from your recent visit:    Resulted Orders   Comprehensive Metabolic Panel   Result Value Ref Range    Sodium 139 136 - 145 mmol/L    Potassium 4.2 3.5 - 5.0 mmol/L    Chloride 104 98 - 107 mmol/L    CO2 23 22 - 31 mmol/L    Anion Gap, Calculation 12 5 - 18 mmol/L    Glucose 102 70 - 125 mg/dL    BUN 15 8 - 28 mg/dL    Creatinine 1.02 0.70 - 1.30 mg/dL    GFR MDRD Af Amer >60 >60 mL/min/1.73m2    GFR MDRD Non Af Amer >60 >60 mL/min/1.73m2    Bilirubin, Total 1.3 (H) 0.0 - 1.0 mg/dL    Calcium 9.3 8.5 - 10.5 mg/dL    Protein, Total 7.4 6.0 - 8.0 g/dL    Albumin 4.2 3.5 - 5.0 g/dL    Alkaline Phosphatase 71 45 - 120 U/L    AST 56 (H) 0 - 40 U/L    ALT 46 (H) 0 - 45 U/L    Narrative    Fasting Glucose reference range is 70-99 mg/dL per  American Diabetes Association (ADA) guidelines.   Vitamin D, Total (25-Hydroxy)   Result Value Ref Range    Vitamin D, Total (25-Hydroxy) 24.8 (L) 30.0 - 80.0 ng/mL    Narrative    Deficiency <10.0 ng/mL  Insufficiency 10.0-29.9 ng/mL  Sufficiency 30.0-80.0 ng/mL  Toxicity (possible) >100.0 ng/mL   Urinalysis-UC if Indicated   Result Value Ref Range    Color, UA Yellow Colorless, Yellow, Straw, Light Yellow    Clarity, UA Clear Clear    Glucose, UA Negative Negative    Bilirubin, UA Negative Negative    Ketones, UA Negative Negative    Specific Gravity, UA 1.025 1.005 - 1.030    Blood, UA Trace (!) Negative    pH, UA 5.5 5.0 - 8.0    Protein, UA Negative Negative mg/dL    Urobilinogen, UA 0.2 E.U./dL 0.2 E.U./dL, 1.0 E.U./dL    Nitrite, UA Negative Negative    Leukocytes, UA Moderate (!) Negative    Bacteria, UA Moderate (!) None Seen hpf    RBC, UA 3-5 (!) None Seen, 0-2 hpf    WBC, UA 5-10 (!) None  Seen, 0-5 hpf    Squam Epithel, UA 0-5 None Seen, 0-5 lpf    Narrative    Urine Culture ordered based on [facility] criteria   PSA (Prostatic-Specific Antigen), Annual Screen   Result Value Ref Range    PSA 0.7 0.0 - 6.5 ng/mL    Narrative    Method is Abbott Prostate-Specific Antigen (PSA)  Standard-WHO 1st International (90:10)   Lipid Cascade   Result Value Ref Range    Cholesterol 213 (H) <=199 mg/dL    Triglycerides 122 <=149 mg/dL    HDL Cholesterol 53 >=40 mg/dL    LDL Calculated 136 (H) <=129 mg/dL    Patient Fasting > 8hrs? Yes    Thyroid Cascade   Result Value Ref Range    TSH 1.96 0.30 - 5.00 uIU/mL   HM1 (CBC with Diff)   Result Value Ref Range    WBC 7.2 4.0 - 11.0 thou/uL    RBC 5.35 4.40 - 6.20 mill/uL    Hemoglobin 16.3 14.0 - 18.0 g/dL    Hematocrit 46.7 40.0 - 54.0 %    MCV 87 80 - 100 fL    MCH 30.4 27.0 - 34.0 pg    MCHC 34.9 32.0 - 36.0 g/dL    RDW 11.9 11.0 - 14.5 %    Platelets 160 140 - 440 thou/uL    MPV 8.1 7.0 - 10.0 fL    Neutrophils % 65 50 - 70 %    Lymphocytes % 22 20 - 40 %    Monocytes % 8 2 - 10 %    Eosinophils % 5 0 - 6 %    Basophils % 1 0 - 2 %    Neutrophils Absolute 4.6 2.0 - 7.7 thou/uL    Lymphocytes Absolute 1.5 0.8 - 4.4 thou/uL    Monocytes Absolute 0.6 0.0 - 0.9 thou/uL    Eosinophils Absolute 0.4 0.0 - 0.4 thou/uL    Basophils Absolute 0.0 0.0 - 0.2 thou/uL   Vitamin B12   Result Value Ref Range    Vitamin B-12 506 213 - 816 pg/mL   Culture, Urine   Result Value Ref Range    Culture No Growth        Take these are all pretty darn good  No urine  Blood counts are normal  Liver tests are just slightly elevated ALT 46 AST 56  Vitamin D level is slightly low  B12 level is normal  Prostate cancer test is normal    In my opinion the you look awesome!  Keep active  Eat a nutritious clean diet low in sugar and low in animal fat  Follow through with Dr. Tammy Gonsalves and matthew spencer to [facility] Jolly    Please call with questions or contact us using  [employer].    Sincerely,        Electronically signed by Fan Contreras MD

## 2021-06-21 NOTE — PROGRESS NOTES
ASSESSMENT & PLAN    No problem-specific Assessment & Plan notes found for this encounter.      Foster was seen today for bloated, abdominal pain and hiccups.    Diagnoses and all orders for this visit:    Gastroesophageal reflux disease without esophagitis    Bowel habit changes    Bloating symptom    Flatulence, eructation and gas pain    Other orders  -     Influenza High Dose, Seasonal 65+ yrs        Patient Instructions   I would like you to take over-the-counter  Fiber Smart by raw this can be purchased at the [facility] start with  2 tablets in the morning this replaces Metamucil  Increase to 4 tablets in the morning    Consider taking over-the-counter Culturelle 1 daily for a total of 30 days    Eat prebiotic/probiotic foods such is homemade sauerkraut    If your symptoms are persistent I would consider doing stool samples for Giardia, parasites, bacteria given your episode in July    For the time being continue to use simethicone/Gas-X for gas  Continue use over-the-counter Tums or chewable Mylanta periodically for acid reflux symptoms  Baking soda can also be a natural remedy    The gallbladder is located in the upper abdomen if you have persistent symptoms especially pain that is localized to the anterior chest radiates to the back or shoulder would consider doing an ultrasound    [place] of the things he can do to help with reflux as well as some natural remedies in addition to the ones mentioned above    GERD  Head of Bed Elevate to 30 Degrees  Weight Reduction  Shoot for BMI <25 or Waist Hip Ratio less 1   NO Tobacco worsens reflux  Avoid Tight Clothes  No Food 3 hours Prior to Bed  No Food within 1 Hour Exercise  Small Portions/ Chew well!  Avoid Triggers:  Chocolate/Mint/Caffeine/Alcohol/Citrus/Garlic/ Onion / Carbonated    Natural RX---- GERD   Melatonin  3- 10 mg at bedtime support lower esophageal sphincter  Alginates  1000 mg Daily  Found in OTC>>> Gaviscon Advance  Aloe Vera (not Latex  "brand) 10 ml Daily  Tea Bag Licorice Tea  Sips throughout the Day  Slippery Elm   Vitamin D shoot for level 50-60  Probiotics Lactobicillus/Bifidus Species        Return in about 3 months (around 2/13/2019).            CHIEF COMPLAINT: Foster Enriquez had concerns including Bloated (pt states stomach issues, not sure if bloated but lots of gas); Abdominal Pain (pt states abdomen discomfort for several months now ); and Hiccups (pt states lots of hiccups and burps after eating ).    [place]: 1.............. had concerns including Bloated (pt states stomach issues, not sure if bloated but lots of gas); Abdominal Pain (pt states abdomen discomfort for several months now ); and Hiccups (pt states lots of hiccups and burps after eating ).    1. Gastroesophageal reflux disease without esophagitis    2. Bowel habit changes    3. Bloating symptom    4. Flatulence, eructation and gas pain          CC:             Why are you here today?                       Bloating, gas, and bowel movements patient comes in today he has the main complaint has had an upset stomach on and off for several months made his appointment 6 weeks ago describes the pain in his lower stomach uncomfortable he believes actually got better after he initially stopped Metamucil and a stool softener then realized that he was having lower discomfort and stopped his omeprazole that pain got better he restarted his Metamucil stool softener as well as took a homeopathic preparation from his wife which was made of food and has now been having normal bowel movements over the last week and a half he occasionally has \"upset stomach it hurts he was wondering if it is his gallbladder simethicone takes away a pressure sensation Tums provides immediate relief of what he considers to be acid reflux but he still occasionally has pain in his lower abdomen especially in the lower right side as well as a lower site itself    He is down weight wise back in July had a food " poisoning episode he believes he ate some hamburger and was sicker than a dog but this is now resolved he is also been having some postnasal drip and hoarseness in the past he is used something that was given to him by an ear nose and throat and he felt that it was helpful but it was 15 years ago he is now been doing some Cleo Barnhart with some little with a little bit of resolved he has seen gastroenterology as well as ear nose and throat back in October GI as well as ENT concur that most of his symptoms are related to acid reflux he initially started omeprazole then went to 40 mg a day after seeing ear nose and throat but we talked about the risk benefits including the risk of changing your GI chapis and causing some downstream effects    What is the issue like in one word?:                              Upset stomach or hurt  How long is it ongoing: days, weeks,months?:                 Months  What makes it worse: activity? Eating? Movement? :      Eating  What makes it better?:                                                      Better after he started having normal bowel movements  0/10-10/10:Pain/Intesity                                                    today the pain is one half less than 1 out of 10      Any associated Sx to above complaint:   He has having bloating currently is having normal bowel movements no blood the last colonoscopy was 2 years ago    Any other Problems in order of Priority:        SUBJECTIVE:  Foster Enriquez is a 82 y.o. male    Past Medical History:   Diagnosis Date     Hypertension      Urinary retention      Past Surgical History:   Procedure Laterality Date     AK APPENDECTOMY      Description: Appendectomy;  Recorded: 03/02/2012;     PROSTATE SURGERY       Latex; Nitroglycerin; Sulfa (sulfonamide antibiotics); and Ciprofloxacin  Current Outpatient Medications   Medication Sig Dispense Refill     amLODIPine (NORVASC) 5 MG tablet TAKE 1 TABLET BY MOUTH EVERY DAY 90 tablet 3      aspirin 81 MG EC tablet Take 81 mg by mouth daily.       docusate sodium (STOOL SOFTENER ORAL) Take by mouth.       dutasteride (AVODART) 0.5 mg capsule TAKE 1 CAPSULE BY MOUTH DAILY 90 capsule 3     hydrocortisone (ANUSOL-HC) 2.5 % rectal cream Insert into the rectum 2 (two) times a day. 30 g 3     psyllium seed, sugar, (METAMUCIL) Powd 28.3       omeprazole (PRILOSEC) 20 MG capsule TAKE 1 CAPSULE BY MOUTH EVERY DAY 90 capsule 2     No current facility-administered medications for this visit.      Family History   Problem Relation Age of Onset     Arthritis Mother      Colon cancer Mother      Hypertension Mother      Cancer Brother         BLADDER     Hypertension Brother      Cancer Daughter      Social History     Socioeconomic History     Marital status:      Spouse name: None     Number of children: None     Years of education: None     Highest education level: None   Social Needs     Financial resource strain: None     Food insecurity - worry: None     Food insecurity - inability: None     Transportation needs - medical: None     Transportation needs - non-medical: None   Occupational History     None   Tobacco Use     Smoking status: Former Smoker     Smokeless tobacco: Never Used   Substance and Sexual Activity     Alcohol use: No     Drug use: No     Sexual activity: Yes     Partners: Female   Other Topics Concern     None   Social History Narrative     None     Patient Active Problem List   Diagnosis     Cataract     Acute Urinary Retention     Skin Abscess     Vitamin B12 deficiency     Dyslipidemia     Benign non-nodular prostatic hyperplasia with lower urinary tract symptoms     Actinic Keratosis     Abnormal Blood Chemistry     Dizziness     Benign Adenomatous Polyp Of The Large Intestine     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 he has never used smokeless tobacco. He reports that he does not drink alcohol or use drugs.    REVIEW OF SYSTEMS:   Family history not pertinent to chief complaint or presenting problem    Review of Systems:      Nervous System:  No headache,                                No dizziness, fainting                                No memory loss.                                No Paresthesia    Ears: No hearing loss or ringing in the ears    Eyes: No blurring of vision, Double Vision            No redness, itching or dryness.    Nose: No nosebleed or loss of smell    Mouth: No mouth sores or loss of taste    Throat: No hoarseness or difficulty swallowing    Neck: No enlarged thyroid or lymph nodes.    Heart: No chest pain, palpitation or irregular heartbeat.              No swelling of hands or feet    Lungs: No shortness of breath,               No cough              No wheezing or hemoptysis.    Gastrointestinal: No nausea or vomiting,                               No constipation or diarrhea.                              No acid reflux, abdominal pain or blood in stools.    Kidney/Bladdr: No polyuria, polydipsia, nocturia or hematuria.                          No Frequency or Urgency     Genital/Sexual: No loss of libido                            No Sex function Changes     Skin: No rash    Muscles/Joints/Bones: No Muscle morning stiffness, muscle aches                                        No Joint Pain  Review of systems otherwise negative as requested from patient, except   Those positive ROS outlined and discussed in [place].    OBJECTIVE:  /74 (Patient Site: Right Arm, Patient Position: Sitting, Cuff Size: Adult Large)   Pulse 64   Resp 16   Wt 200 lb 4 oz (90.8 kg)   SpO2 98%   BMI 28.94 kg/m      GENERAL:     No acute  distress.   Alert and oriented X 3         Physical:    Abdomen soft flat nontender with positive bowel sounds no organ enlargement he has full femoral pulses  Lungs are clear with slightly diminished breath sounds left base history of an elevated diaphragm  Cardiac S1-S2 regular sinus appreciable murmur gallop  Appreciable inguinal hernia  Weeks in full sentences  Up and on the table without difficulty          The ASCVD Risk score (Benedict KRISTY Jr., et al., 2013) failed to calculate for the following reasons:    The 2013 ASCVD risk score is only valid for ages 40 to 79    ASSESSMENT & PLAN      Foster was seen today for bloated, abdominal pain and hiccups.    Diagnoses and all orders for this visit:    Gastroesophageal reflux disease without esophagitis    Bowel habit changes    Bloating symptom    Flatulence, eructation and gas pain    Other orders  -     Influenza High Dose, Seasonal 65+ yrs        Return in about 3 months (around 2/13/2019).       Anticipatory Guidance and Symptomatic Cares Discussed   Advised to call back directly if there are further questions, or if these symptoms fail to improve as anticipated or worsen.  Return to clinic if patient has a clinical concern that warrants an exam.         I spent 35 minutes with this patient face to face, of which 50% or greater was spent in counseling and coordination of care with regards to Foster was seen today for bloated, abdominal pain and hiccups.    Diagnoses and all orders for this visit:    Gastroesophageal reflux disease without esophagitis    Bowel habit changes    Bloating symptom    Flatulence, eructation and gas pain    Other orders  -     Influenza High Dose, Seasonal 65+ yrs        Fan Contreras MD  Family Medicine   [facility] [address]  [phone]

## 2021-06-29 NOTE — PROGRESS NOTES
Progress Notes by Fan Contreras MD at 7/20/2020  8:00 AM     Author: Fan Contreras MD Service: -- Author Type: Physician    Filed: 7/20/2020  5:39 PM Encounter Date: 7/20/2020 Status: Signed    : Fan Contreras MD (Physician)       ASSESSMENT & PLAN    Essential Hypertension  Amlodipine 5 mg   NO side effects   Controlled       Benign non-nodular prostatic hyperplasia with lower urinary tract symptoms  Dutasteride (Avodart)   Had Urine Retention 270 ml  Went back to 120 ml retention  No bathroom at night         Foster was seen today for annual wellness visit.    Diagnoses and all orders for this visit:    Screening for prostate cancer  -     PSA (Prostatic-Specific Antigen), Annual Screen    Essential hypertension  -     Comprehensive Metabolic Panel  -     HM1(CBC and Differential)  -     Urinalysis-UC if Indicated  -     Thyroid Cascade  -     HM1 (CBC with Diff)    Hypertension  -     amLODIPine (NORVASC) 5 MG tablet; Take 1 tablet (5 mg total) by mouth daily.    GERD (gastroesophageal reflux disease)  -     omeprazole (PRILOSEC) 20 MG capsule; TAKE 1 CAPSULE BY MOUTH DAILY    Benign non-nodular prostatic hyperplasia with lower urinary tract symptoms    Vitamin D deficiency  -     Vitamin D, Total (25-Hydroxy)    Dyslipidemia  -     Lipid Cascade  -     Thyroid Cascade    Vitamin B12 deficiency  -     Vitamin B12    Other viral warts        Patient Instructions       Patient Education     Exercise for a Healthier Heart  You may wonder how you can improve the health of your heart. If youre thinking about exercise, youre on the right track. You dont need to become an athlete, but you do need a certain amount of brisk exercise to help strengthen your heart. If you have been diagnosed with a heart condition, your doctor may recommend exercise to help stabilize your condition. To help make exercise a habit, choose safe, fun activities.       Be sure to check with your health care provider before  starting an exercise program.    Why exercise?  Exercising regularly offers many healthy rewards. It can help you do all of the following:    Improve your blood cholesterol levels to help prevent further heart trouble    Lower your blood pressure to help prevent a stroke or heart attack    Control diabetes, or reduce your risk of getting this disease    Improve your heart and lung function    Reach and maintain a healthy weight    Make your muscles stronger and more limber so you can stay active    Prevent falls and fractures by slowing the loss of bone mass (osteoporosis)    Manage stress better  Exercise tips  Ease into your routine. Set small goals. Then build on them.  Exercise on most days. Aim for a total of 150 or more minutes of moderate to  vigorous intensity activity each week. Consider 40 minutes, 3 to 4 times a week. For best results, activity should last for 40 minutes on average. It is OK to work up to the 40 minute period over time. Examples of moderate-intensity activity is walking one mile in 15 minutes or 30 to 45 minutes of yard work.  Step up your daily activity level. Along with your exercise program, try being more active throughout the day. Walk instead of drive. Do more household tasks or yard work.  Choose one or more activities you enjoy. Walking is one of the easiest things you can do. You can also try swimming, riding a bike, or taking an exercise class.  Stop exercising and call your doctor if you:    Have chest pain or feel dizzy or lightheaded    Feel burning, tightness, pressure, or heaviness in your chest, neck, shoulders, back, or arms    Have unusual shortness of breath    Have increased joint or muscle pain    Have palpitations or an irregular heartbeat      7243-2663 The [employer]. [address] [place] [address]. All rights reserved. This information is not intended as a substitute for professional medical care. Always follow your healthcare professional's  instructions.         Patient Education   Understanding USDA MyPlate  The USDA (US Department of Agriculture) has guidelines to help you make healthy food choices. These are called MyPlate. MyPlate shows the food groups that make up healthy meals using the image of a place setting. Before you eat, think about the healthiest choices for what to put onto your plate or into your cup or bowl. To learn more about building a healthy plate, visit www.choosemyplate.gov.       The Food Groups    Fruits: Any fruit or 100% fruit juice counts as part of the Fruit Group. Fruits may be fresh, canned, frozen, or dried, and may be whole, cut-up, or pureed. Make half your plate fruits and vegetables.    Vegetables: Any vegetable or 100% vegetable juice counts as a member of the Vegetable Group. Vegetables may be fresh, frozen, canned, or dried. They can be served raw or cooked and may be whole, cut-up, or mashed. Make half your plate fruits and vegetables.     Grains: All foods made from grains are part of the Grains Group. These include wheat, rice, oats, cornmeal, and barley such as bread, pasta, oatmeal, cereal, tortillas, and grits. Grains should be no more than a quarter of your plate. At least half of your grains should be whole grains.    Protein: This group includes meat, poultry, seafood, beans and peas, eggs, processed soy products (like tofu), nuts (including nut butters), and seeds. Make protein choices no more than a quarter of your plate. Meat and poultry choices should be lean or low fat.    Dairy: All fluid milk products and foods made from milk that contain calcium, like yogurt and cheese are part of the Dairy Group. (Foods that have little calcium, such as cream, butter, and cream cheese, are not part of the group.) Most dairy choices should be low-fat or fat-free.    Oils: These are fats that are liquid at room temperature. They include canola, corn, olive, soybean, and sunflower oil. Foods that are mainly oil  include mayonnaise, certain salad dressings, and soft margarines. You should have only 5 to 7 teaspoons of oils a day. You probably already get this much from the food you eat.  Use SMB Suiteer to Help Build Your Meals  The SuperTracker can help you plan and track your meals and activity. You can look up individual foods to see or compare their nutritional value. You can get guidelines for what and how much you should eat. You can compare your food choices. And you can assess personal physical activities and see ways you can improve. Go to www.[place].gov/supertracker/.    3008-0361 [employer]. [address] [place] [address], [place] [id number]. All rights reserved. This information is not intended as a substitute for professional medical care. Always follow your healthcare professional's instructions.           Patient Education   Understanding Advance Care Planning  Advance care planning is the process of deciding ones own future medical care. It helps ensure that if you cant speak for yourself, your wishes can still be carried out. The plan is a series of legal documents that note a persons wishes. The documents vary by state. Advance care planning may be done when a person has a serious illness that is expected to get worse. It may be done before major surgery. And it can help you and your family be prepared in case of a major illness or injury. Advance care planning helps with making decisions at these times.       A health care proxy is a person who acts as the voice of a patient when the patient cant speak for himself or herself. The name of this role varies by state. It may be called a Durable Medical Power of  or Durable Power of  for Healthcare. It may be called an agent, surrogate, or advocate. Or it may be called a representative or decision maker. It is an official duty that is identified by a legal document. The document also varies by state.    Why Is Advance Care Planning  Important?  If a person communicates their healthcare wishes:    They will be given medical care that matches their values and goals.    Their family members will not be forced to make decisions in a crisis with no guidance.  Creating a Plan  Making an advance care plan is often done in 3 steps:    Thinking about ones wishes. To create an advance care plan, you should think about what kind of medical treatment you would want if you lose the ability to communicate. Are there any situations in which you would refuse or stop treatment? Are there therapies you would want or not want? And whom do you want to make decisions for you? There are many places to learn more about how to plan for your care. Ask your doctor or  for resources.    Picking a health care proxy. This means choosing a trusted person to speak for you only when you cant speak for yourself. When you cannot make medical decisions, your proxy makes sure the instructions in your advance care plan are followed. A proxy does not make decisions based on his or her own opinions. They must put aside those opinions and values if needed, and carry out your wishes.    Filling out the legal documents. There are several kinds of legal documents for advance care planning. Each one tells health care providers your wishes. The documents may vary by state. They must be signed and may need to be witnessed or notarized. You can cancel or change them whenever you wish. Depending on your state, the documents may include a Healthcare Proxy form, Living Will, Durable Medical Power of , Advance Directive, or others.  The Familys Role  The best help a family can give is to support their loved ones wishes. Open and honest communication is vital. Family should express any concerns they have about the patients choices while the patient can still make decisions.    6493-4135 The [employer]. [address] [place] [address], [place] [address]. All rights  reserved. This information is not intended as a substitute for professional medical care. Always follow your healthcare professional's instructions.         Also, [facility] offers a free, downloadable health care directive that allows you to share your treatment choices and personal preferences if you cannot communicate your wishes. It also allows you to appoint another person (called a health care agent) to make health care decisions if you are unable to do so. You can download an advance directive by going here: [email or web address]     Patient Education   Personalized Prevention Plan  You are due for the preventive services outlined below.  Your care team is available to assist you in scheduling these services.  If you have already completed any of these items, please share that information with your care team to update in your medical record.  Health Maintenance   Topic Date Due   ? ZOSTER VACCINES (2 of 3) 03/01/2013   ? TD 18+ HE  07/01/2018   ? MEDICARE ANNUAL WELLNESS VISIT  02/06/2019   ? FALL RISK ASSESSMENT  04/04/2020   ? INFLUENZA VACCINE RULE BASED (1) 08/01/2020   ? ADVANCE CARE PLANNING  04/04/2024   ? PNEUMOCOCCAL IMMUNIZATION 65+ LOW/MEDIUM RISK  Completed   ? HEPATITIS B VACCINES  Aged Out            No follow-ups on file.       Little interest or pleasure in doing things: Not at all  Feeling down, depressed, or hopeless: Not at all    CHIEF COMPLAINT: Foster Enriquez had concerns including Annual Wellness Visit (non fasting ).    [place]: 1.............. SUBJECTIVE:  Foster Enriquez is a 84 y.o. male had concerns including Annual Wellness Visit (non fasting ).    1. Screening for prostate cancer    2. Essential hypertension    3. Hypertension    4. GERD (gastroesophageal reflux disease)    5. Benign non-nodular prostatic hyperplasia with lower urinary tract symptoms    6. Vitamin D deficiency    7. Dyslipidemia    8. Vitamin B12 deficiency    9. Other  "viral warts          Allergies   Allergen Reactions   ? Latex    ? Nitroglycerin      Annotation: Severe reaction- hypotension     ? Sulfa (Sulfonamide Antibiotics)    ? Ciprofloxacin Rash                         SOCIAL: He  reports that he has quit smoking. He has never used smokeless tobacco. He reports that he does not drink alcohol or use drugs.    REVIEW OF SYSTEMS:   Family history not pertinent to chief complaint or presenting problem    Review of systems otherwise negative as requested from patient, except   Those positive ROS outlined and discussed in [facility].      Care Team   [facility]   Dermatolgy   Ophthalmology         VITALS:  Vitals:    07/20/20 0803   BP: 124/60   Pulse: 80   Weight: 195 lb (88.5 kg)   Height: 5' 9.5\" (1.765 m)     Wt Readings from Last 3 Encounters:   07/20/20 195 lb (88.5 kg)   04/04/19 201 lb 12 oz (91.5 kg)   11/13/18 200 lb 4 oz (90.8 kg)     Body mass index is 28.38 kg/m .    Physical Exam:      Physical:  General Appearance: Healthy-appearingy.   Head:  No deformity  Eyes: Sclerae white, pupils equal and reactive, extra ocular movements intact left cataract extracted  Ears:  hearing aids bilaterally  Nose: Clear, normal mucosa no drainage or crusting  Throat: Lips, tongue, and mucosa are moist, pink and intact; tongue no thrush oral pharynx no injection or lesions  Neck: Supple, symmetric ROM no nodes   No carotid Buits  Chest: Lungs clear to auscultation, no retractions  Heart: Regular rate & rhythm, S1 S2, no murmur  Abdomen: Soft, non-tender, no masses; umbilical area normal   Pulses: Equal femoral pulses  : No hernia palpable   Extremities: Well-perfused, warm and dry, No Edema  Palpable Pulses Bilateral  Neuro: Easily aroused good tone NO tremor   Skin  No Rash viral warts right arm right hand x3 freeze thaw method 30 seconds  Multiple hemangiomas  Seborrheic keratoses             I spent 30  minutes with this patient face to face, of which 50% or greater was spent " in counseling and coordination of care with regards to Foster was seen today for annual wellness visit.    Diagnoses and all orders for this visit:    Screening for prostate cancer  -     PSA (Prostatic-Specific Antigen), Annual Screen    Essential hypertension  -     Comprehensive Metabolic Panel  -     HM1(CBC and Differential)  -     Urinalysis-UC if Indicated  -     Thyroid Cascade  -     HM1 (CBC with Diff)    Hypertension  -     amLODIPine (NORVASC) 5 MG tablet; Take 1 tablet (5 mg total) by mouth daily.    GERD (gastroesophageal reflux disease)  -     omeprazole (PRILOSEC) 20 MG capsule; TAKE 1 CAPSULE BY MOUTH DAILY    Benign non-nodular prostatic hyperplasia with lower urinary tract symptoms    Vitamin D deficiency  -     Vitamin D, Total (25-Hydroxy)    Dyslipidemia  -     Lipid Cascade  -     Thyroid Cascade    Vitamin B12 deficiency  -     Vitamin B12    Other viral warts        Fan Contreras MD  [facility] [address] [phone]  Assessment and Plan:       Problem List Items Addressed This Visit     Vitamin D deficiency    Relevant Orders    Vitamin D, Total (25-Hydroxy)    Vitamin B12 deficiency    Relevant Orders    Vitamin B12    GERD (gastroesophageal reflux disease)    Relevant Medications    omeprazole (PRILOSEC) 20 MG capsule    Essential Hypertension     Amlodipine 5 mg   NO side effects   Controlled            Relevant Medications    amLODIPine (NORVASC) 5 MG tablet    Other Relevant Orders    Comprehensive Metabolic Panel    HM1(CBC and Differential)    Urinalysis-UC if Indicated    Thyroid Cascade    HM1 (CBC with Diff)    Dyslipidemia    Relevant Orders    Lipid Cascade    Thyroid [place]    Benign non-nodular prostatic hyperplasia with lower urinary tract symptoms     Dutasteride (Avodart)   Had Urine Retention 270 ml  Went back to 120 ml retention  No bathroom at night              Other Visit Diagnoses     Screening for prostate cancer     -  Primary    Relevant Orders    PSA (Prostatic-Specific Antigen), Annual Screen    Hypertension        Relevant Medications    amLODIPine (NORVASC) 5 MG tablet    Other Relevant Orders    Comprehensive Metabolic Panel    HM1(CBC and Differential)    Urinalysis-UC if Indicated    Thyroid Cascade    HM1 (CBC with Diff)    Other viral warts          Annual wellness risk reviewed  Normal clock  Excellent health needs more exercise 0 alcoholic beverages  Try to get more fruits and fiber  Has carbon monoxide detectors  Does not need any help  Has hearing aids  Follows with Dr. Munoz for his urine  He feels his emotional health is excellent and has no issues with depression encouraged to have a living well no falls in the past 12 months      The patient's current medical problems were reviewed.    I have had an Advance Directives discussion with the patient.  The following health maintenance schedule was reviewed with the patient and provided in printed form in the after visit summary:   Health Maintenance   Topic Date Due   ? ZOSTER VACCINES (2 of 3) 03/01/2013   ? TD 18+ HE  07/01/2018   ? MEDICARE ANNUAL WELLNESS VISIT  02/06/2019   ? FALL RISK ASSESSMENT  04/04/2020   ? INFLUENZA VACCINE RULE BASED (1) 08/01/2020   ? ADVANCE CARE PLANNING  04/04/2024   ? PNEUMOCOCCAL IMMUNIZATION 65+ LOW/MEDIUM RISK  Completed   ? HEPATITIS B VACCINES  Aged Out        Subjective:   Chief Complaint: Foster Enriquez is an 84 y.o. male here for an Annual Wellness visit.   HPI:      Review of Systems:    Please see above.  The rest of the review of systems are negative for all systems.    Patient Care Team:  Fan Contreras MD as PCP - General  Fan Contreras MD as Assigned PCP     Patient Active Problem List   Diagnosis   ? Cataract   ? Vitamin B12 deficiency   ? Dyslipidemia   ? Benign non-nodular prostatic hyperplasia with lower urinary tract symptoms   ? Actinic Keratosis   ? Abnormal Blood Chemistry   ? Benign Adenomatous  Polyp Of The Large Intestine   ? Essential Hypertension   ? Idiopathic Urticaria   ? GERD (gastroesophageal reflux disease)   ? Vitamin D deficiency   ? Effusion of right knee     Past Medical History:   Diagnosis Date   ? Hypertension    ? Urinary retention       Past Surgical History:   Procedure Laterality Date   ? ME APPENDECTOMY      Description: Appendectomy;  Recorded: 03/02/2012;   ? PROSTATE SURGERY        Family History   Problem Relation Age of Onset   ? Arthritis Mother    ? Colon cancer Mother    ? Hypertension Mother    ? Cancer Brother         BLADDER   ? Hypertension Brother    ? Cancer Daughter       Social History     Socioeconomic History   ? Marital status:      Spouse name: Not on file   ? Number of children: Not on file   ? Years of education: Not on file   ? Highest education level: Not on file   Occupational History   ? Not on file   Social Needs   ? Financial resource strain: Not on file   ? Food insecurity     Worry: Not on file     Inability: Not on file   ? Transportation needs     Medical: Not on file     Non-medical: Not on file   Tobacco Use   ? Smoking status: Former Smoker   ? Smokeless tobacco: Never Used   Substance and Sexual Activity   ? Alcohol use: No   ? Drug use: No   ? Sexual activity: Yes     Partners: Female   Lifestyle   ? Physical activity     Days per week: Not on file     Minutes per session: Not on file   ? Stress: Not on file   Relationships   ? Social connections     Talks on phone: Not on file     Gets together: Not on file     Attends Zoroastrian service: Not on file     Active member of club or organization: Not on file     Attends meetings of clubs or organizations: Not on file     Relationship status: Not on file   ? Intimate partner violence     Fear of current or ex partner: Not on file     Emotionally abused: Not on file     Physically abused: Not on file     Forced sexual activity: Not on file   Other Topics Concern   ? Not on file   Social History  "Narrative   ? Not on file      Current Outpatient Medications   Medication Sig Dispense Refill   ? dutasteride (AVODART) 0.5 mg capsule TAKE 1 CAPSULE BY MOUTH DAILY 90 capsule 3   ? omeprazole (PRILOSEC) 20 MG capsule TAKE 1 CAPSULE BY MOUTH DAILY 90 capsule 3   ? amLODIPine (NORVASC) 5 MG tablet Take 1 tablet (5 mg total) by mouth daily. 90 tablet 0     No current facility-administered medications for this visit.       Objective:   Vital Signs:   Visit Vitals  /60   Pulse 80   Ht 5' 9.5\" (1.765 m)   Wt 195 lb (88.5 kg)   BMI 28.38 kg/m           VisionScreening:  No exam data present     PHYSICAL EXAM    Assessment Results 7/20/2020   Activities of Daily Living No help needed   Instrumental Activities of Daily Living No help needed   Mini Cog Total Score 5   Some recent data might be hidden     A Mini-Cog score of 0-2 suggests the possibility of dementia, score of 3-5 suggests no dementia  No fall risk  OK Cognitive     Identified Health Risks:     He is at risk for lack of exercise and has been provided with information to increase physical activity for the benefit of his well-being.  The patient was counseled and encouraged to consider modifying their diet and eating habits. He was provided with information on recommended healthy diet options.  Information regarding advance directives (living fonseca), including where he can download the appropriate form, was provided to the patient via the AVS.            "

## 2021-07-03 NOTE — ADDENDUM NOTE
Addendum Note by Gui Lion at 3/4/2019 12:15 PM     Author: Gui Lion Service: -- Author Type:     Filed: 3/25/2019  9:12 AM Encounter Date: 3/4/2019 Status: Signed    : Gui Lion ()    Addended by: GUI LION on: 3/25/2019 09:12 AM        Modules accepted: Orders

## 2021-08-21 ENCOUNTER — HEALTH MAINTENANCE LETTER (OUTPATIENT)
Age: 86
End: 2021-08-21

## 2021-08-23 DIAGNOSIS — I10 HYPERTENSION: ICD-10-CM

## 2021-08-23 DIAGNOSIS — I10 HYPERTENSION, UNSPECIFIED TYPE: Primary | ICD-10-CM

## 2021-08-23 RX ORDER — AMLODIPINE BESYLATE 5 MG/1
TABLET ORAL
Qty: 90 TABLET | Refills: 3 | Status: SHIPPED | OUTPATIENT
Start: 2021-08-23 | End: 2022-08-03

## 2021-08-23 NOTE — TELEPHONE ENCOUNTER
Reason for Call:  Medication or medication refill:    Do you use a [facility]?  Name of the pharmacy and phone number for the current request:  [facility] [place]-updated pharm    Name of the medication requested:     amLODIPine (NORVASC) 5 MG tablet 90 tablet 3 8/31/2020  No   Sig: [AMLODIPINE (NORVASC) 5 MG TABLET] TAKE 1 TABLET BY MOUTH DAILY         Other request: states pharm tried 6 times    Can we leave a detailed message on this number? YES    Phone number patient can be reached at: Home number on file [phone] (home)    Best Time: any    Call taken on 8/23/2021 at 12:14 PM by Pam J. Behr

## 2021-10-16 ENCOUNTER — HEALTH MAINTENANCE LETTER (OUTPATIENT)
Age: 86
End: 2021-10-16

## 2021-12-16 ENCOUNTER — TRANSFERRED RECORDS (OUTPATIENT)
Dept: HEALTH INFORMATION MANAGEMENT | Facility: CLINIC | Age: 86
End: 2021-12-16

## 2022-01-25 ENCOUNTER — OFFICE VISIT (OUTPATIENT)
Dept: FAMILY MEDICINE | Facility: CLINIC | Age: 87
End: 2022-01-25
Payer: COMMERCIAL

## 2022-01-25 VITALS
WEIGHT: 204.06 LBS | SYSTOLIC BLOOD PRESSURE: 116 MMHG | BODY MASS INDEX: 30.22 KG/M2 | HEART RATE: 77 BPM | DIASTOLIC BLOOD PRESSURE: 52 MMHG | OXYGEN SATURATION: 95 % | HEIGHT: 69 IN

## 2022-01-25 DIAGNOSIS — N40.1 BENIGN NON-NODULAR PROSTATIC HYPERPLASIA WITH LOWER URINARY TRACT SYMPTOMS: ICD-10-CM

## 2022-01-25 DIAGNOSIS — E78.5 DYSLIPIDEMIA: ICD-10-CM

## 2022-01-25 DIAGNOSIS — I10 ESSENTIAL HYPERTENSION: ICD-10-CM

## 2022-01-25 DIAGNOSIS — E53.8 VITAMIN B12 DEFICIENCY: Primary | ICD-10-CM

## 2022-01-25 DIAGNOSIS — E55.9 VITAMIN D DEFICIENCY: ICD-10-CM

## 2022-01-25 DIAGNOSIS — Z00.00 ENCOUNTER FOR MEDICARE ANNUAL WELLNESS EXAM: ICD-10-CM

## 2022-01-25 DIAGNOSIS — H53.8 BLURRED VISION: ICD-10-CM

## 2022-01-25 DIAGNOSIS — Z12.5 SCREENING FOR PROSTATE CANCER: ICD-10-CM

## 2022-01-25 LAB
ALBUMIN SERPL-MCNC: 4.1 G/DL (ref 3.5–5)
ALP SERPL-CCNC: 68 U/L (ref 45–120)
ALT SERPL W P-5'-P-CCNC: 51 U/L (ref 0–45)
ANION GAP SERPL CALCULATED.3IONS-SCNC: 11 MMOL/L (ref 5–18)
AST SERPL W P-5'-P-CCNC: 57 U/L (ref 0–40)
BILIRUB SERPL-MCNC: 2 MG/DL (ref 0–1)
BUN SERPL-MCNC: 17 MG/DL (ref 8–28)
CALCIUM SERPL-MCNC: 9.5 MG/DL (ref 8.5–10.5)
CHLORIDE BLD-SCNC: 105 MMOL/L (ref 98–107)
CHOLEST SERPL-MCNC: 218 MG/DL
CO2 SERPL-SCNC: 25 MMOL/L (ref 22–31)
CREAT SERPL-MCNC: 1.14 MG/DL (ref 0.7–1.3)
ERYTHROCYTE [DISTWIDTH] IN BLOOD BY AUTOMATED COUNT: 12.6 % (ref 10–15)
FASTING STATUS PATIENT QL REPORTED: NO
GFR SERPL CREATININE-BSD FRML MDRD: 63 ML/MIN/1.73M2
GLUCOSE BLD-MCNC: 110 MG/DL (ref 70–125)
HCT VFR BLD AUTO: 50.7 % (ref 40–53)
HDLC SERPL-MCNC: 50 MG/DL
HGB BLD-MCNC: 16.4 G/DL (ref 13.3–17.7)
LDLC SERPL CALC-MCNC: 142 MG/DL
MCH RBC QN AUTO: 28.8 PG (ref 26.5–33)
MCHC RBC AUTO-ENTMCNC: 32.3 G/DL (ref 31.5–36.5)
MCV RBC AUTO: 89 FL (ref 78–100)
PLATELET # BLD AUTO: 152 10E3/UL (ref 150–450)
POTASSIUM BLD-SCNC: 4.2 MMOL/L (ref 3.5–5)
PROT SERPL-MCNC: 7.3 G/DL (ref 6–8)
PSA SERPL-MCNC: 0.44 UG/L (ref 0–6.5)
RBC # BLD AUTO: 5.69 10E6/UL (ref 4.4–5.9)
SODIUM SERPL-SCNC: 141 MMOL/L (ref 136–145)
TRIGL SERPL-MCNC: 131 MG/DL
TSH SERPL DL<=0.005 MIU/L-ACNC: 2.99 UIU/ML (ref 0.3–5)
VIT B12 SERPL-MCNC: 459 PG/ML (ref 213–816)
WBC # BLD AUTO: 6.8 10E3/UL (ref 4–11)

## 2022-01-25 PROCEDURE — 85027 COMPLETE CBC AUTOMATED: CPT | Performed by: FAMILY MEDICINE

## 2022-01-25 PROCEDURE — 82306 VITAMIN D 25 HYDROXY: CPT | Performed by: FAMILY MEDICINE

## 2022-01-25 PROCEDURE — 80061 LIPID PANEL: CPT | Performed by: FAMILY MEDICINE

## 2022-01-25 PROCEDURE — 82607 VITAMIN B-12: CPT | Performed by: FAMILY MEDICINE

## 2022-01-25 PROCEDURE — G0439 PPPS, SUBSEQ VISIT: HCPCS | Performed by: FAMILY MEDICINE

## 2022-01-25 PROCEDURE — 84443 ASSAY THYROID STIM HORMONE: CPT | Performed by: FAMILY MEDICINE

## 2022-01-25 PROCEDURE — 36415 COLL VENOUS BLD VENIPUNCTURE: CPT | Performed by: FAMILY MEDICINE

## 2022-01-25 PROCEDURE — G0103 PSA SCREENING: HCPCS | Performed by: FAMILY MEDICINE

## 2022-01-25 PROCEDURE — 80053 COMPREHEN METABOLIC PANEL: CPT | Performed by: FAMILY MEDICINE

## 2022-01-25 RX ORDER — HYDROCORTISONE 2.5 %
CREAM (GRAM) TOPICAL
COMMUNITY

## 2022-01-25 RX ORDER — ASPIRIN 81 MG/1
81 TABLET, CHEWABLE ORAL
COMMUNITY

## 2022-01-25 ASSESSMENT — MIFFLIN-ST. JEOR: SCORE: 1606

## 2022-01-25 ASSESSMENT — ACTIVITIES OF DAILY LIVING (ADL): CURRENT_FUNCTION: NO ASSISTANCE NEEDED

## 2022-01-25 NOTE — PATIENT INSTRUCTIONS
Thanks for coming in take    So sorry to hear about your daughter    Seen the eye doctor about your left eye  This is the eye that had the cataract extracted    I would like you to have a colonoscopy  You could continue to use Beano and take with meals  I would like you to try INULIN powder  1 tablespoon with applesauce daily  If you have worsening symptoms are persistent we could consider doing a CT scan of your lower abdomen    Try to identify any foods that produce gas  You could try to eliminate dairy products for 3 months and see if this is helpful    We will do lab work today    Continue to stay active    Follow through with Dr. Munoz regarding your bladder    Let me know if you like to see anybody about another hearing evaluation DanL          Actively Pursue Wellness    Eat Whole Foods with High Nutritional Value  -----High Fats Nuts, Olive Oil, Fish, Avocados  -----Lean Meats  -----Vegetables Colorful and In abundance  -----Fresh fruits  -----Beans, Legumes  and Whole Grains    Engage in Moderate Exercise  -----30-60 minutes daily    Get Intentional Restorative Sleep  -----8-10 hours    Cultivate and Maintain Healthy Relationships  -----Family and Friends  -----Work Place  -----Community      Remove and Harmful Factors  -----Stressors  Work and Home   -----Toxins:  Tobacco, Alcohol in Excess, Processed Sugars (White Stuff and High Fructose Corn Syrup, Pollutants (Air and Water)      Be Present and Mindful for Yourself and Family  -----Laugh Together  -----Talk Together and Listen to your Body and Family and Friends  -----Enjoy Meals together        There are Five Documented ways to Reduce Risk  for Chronic Illness:    Exercise Daily  30-60 minutes  No Tobacco Products  Red Wine 4-8 oz daily  BMI less than 25  Mediterranean Style Diet     Eat more Plants>> in abundance and of many colors!  Phyto-nutrients activate our health potential.  Choose:  More Colors. More Diversity  Get More Benefit.    Cultivate  that inner Compost bin!    Rik    Patient Education   Personalized Prevention Plan  You are due for the preventive services outlined below.  Your care team is available to assist you in scheduling these services.  If you have already completed any of these items, please share that information with your care team to update in your medical record.  Health Maintenance Due   Topic Date Due     ANNUAL REVIEW OF  ORDERS  Never done     Zoster (Shingles) Vaccine (2 of 3) 03/01/2013     Diptheria Tetanus Pertussis (DTAP/TDAP/TD) Vaccine (2 - Td or Tdap) 07/01/2018       Exercise for a Healthier Heart  You may wonder how you can improve the health of your heart. If you re thinking about exercise, you re on the right track. You don t need to become an athlete. But you do need a certain amount of brisk exercise to help strengthen your heart. If you have been diagnosed with a heart condition, your healthcare provider may advise exercise to help stabilize your condition. To help make exercise a habit, choose safe, fun activities.      Exercise with a friend. When activity is fun, you're more likely to stick with it.   Before you start  Check with your healthcare provider before starting an exercise program. This is especially important if you have not been active for a while. It's also important if you have a long-term (chronic) health problem such as heart disease, diabetes, or obesity. Or if you are at high risk for having these problems.   Why exercise?  Exercising regularly offers many healthy rewards. It can help you do all of the following:     Improve your blood cholesterol level to help prevent further heart trouble    Lower your blood pressure to help prevent a stroke or heart attack    Control diabetes, or reduce your risk of getting this disease    Improve your heart and lung function    Reach and stay at a healthy weight    Make your muscles stronger so you can stay active    Prevent falls and fractures by slowing  the loss of bone mass (osteoporosis)    Manage stress better    Reduce your blood pressure    Improve your sense of self and your body image  Exercise tips      Ease into your routine. Set small goals. Then build on them. If you are not sure what your activity level should be, talk with your healthcare provider first before starting an exercise routine.    Exercise on most days. Aim for a total of 150 minutes (2 hours and 30 minutes) or more of moderate-intensity aerobic activity each week. Or 75 minutes (1 hour and 15 minutes) or more of vigorous-intensity aerobic activity each week. Or try for a combination of both. Moderate activity means that you breathe heavier and your heart rate increases but you can still talk. Think about doing 40 minutes of moderate exercise, 3 to 4 times a week. For best results, activity should last for about 40 minutes to lower blood pressure and cholesterol. It's OK to work up to the 40-minute period over time. Examples of moderate-intensity activity are walking 1 mile in 15 minutes. Or doing 30 to 45 minutes of yard work.    Step up your daily activity level.  Along with your exercise program, try being more active the whole day. Walk instead of drive. Or park further away so that you take more steps each day. Do more household tasks or yard work. You may not be able to meet the advised mount of physical activity. But doing some moderate- or vigorous-intensity aerobic activity can help reduce your risk for heart disease. Your healthcare provider can help you figure out what is best for you.    Choose 1 or more activities you enjoy.  Walking is one of the easiest things you can do. You can also try swimming, riding a bike, dancing, or taking an exercise class.    When to call your healthcare provider  Call your healthcare provider if you have any of these:     Chest pain or feel dizzy or lightheaded    Burning, tightness, pressure, or heaviness in your chest, neck, shoulders, back, or  arms    Abnormal shortness of breath    More joint or muscle pain    A very fast or irregular heartbeat (palpitations)  [email or web address] last reviewed this educational content on 7/1/2019 2000-2021 [employer]. All rights reserved. This information is not intended as a substitute for professional medical care. Always follow your healthcare professional's instructions.          Understanding USDA MyPlate  The USDA has guidelines to help you make healthy food choices. These are called MyPlate. MyPlate shows the food groups that make up healthy meals using the image of a place setting. Before you eat, think about the healthiest choices for what to put on your plate or in your cup or bowl. To learn more about building a healthy plate, visit www.choosemyplate.gov.    The food groups    Fruits. Any fruit or 100% fruit juice counts as part of the Fruit Group. Fruits may be fresh, canned, frozen, or dried, and may be whole, cut-up, or pureed. Make 1/2 of your plate fruits and vegetables.    Vegetables. Any vegetable or 100% vegetable juice counts as a member of the Vegetable Group. Vegetables may be fresh, frozen, canned, or dried. They can be served raw or cooked and may be whole, cut-up, or mashed. Make 1/2 of your plate fruits and vegetables.    Grains. All foods made from grains are part of the Grains Group. These include wheat, rice, oats, cornmeal, and barley. Grains are often used to make foods such as bread, pasta, oatmeal, cereal, tortillas, and grits. Grains should be no more than 1/4 of your plate. At least half of your grains should be whole grains.    Protein. This group includes meat, poultry, seafood, beans and peas, eggs, processed soy products (such as tofu), nuts (including nut butters), and seeds. Make protein choices no more than 1/4 of your plate. Meat and poultry choices should be lean or low fat.    Dairy. The Dairy Group includes all fluid milk products and foods made from milk that contain  calcium, such as yogurt and cheese. (Foods that have little calcium, such as cream, butter, and cream cheese, are not part of this group.) Most dairy choices should be low-fat or fat-free.    Oils. Oils aren't a food group, but they do contain essential nutrients. However it's important to watch your intake of oils. These are fats that are liquid at room temperature. They include canola, corn, olive, soybean, vegetable, and sunflower oil. Foods that are mainly oil include mayonnaise, certain salad dressings, and soft margarines. You likely already get your daily oil allowance from the foods you eat.  Things to limit  Eating healthy also means limiting these things in your diet:       Salt (sodium). Many processed foods have a lot of sodium. To keep sodium intake down, eat fresh vegetables, meats, poultry, and seafood when possible. Purchase low-sodium, reduced-sodium, or no-salt-added food products at the store. And don't add salt to your meals at home. Instead, season them with herbs and spices such as dill, oregano, cumin, and paprika. Or try adding flavor with lemon or lime zest and juice.    Saturated fat. Saturated fats are most often found in animal products such as beef, pork, and chicken. They are often solid at room temperature, such as butter. To reduce your saturated fat intake, choose leaner cuts of meat and poultry. And try healthier cooking methods such as grilling, broiling, roasting, or baking. For a simple lower-fat swap, use plain nonfat yogurt instead of mayonnaise when making potato salad or macaroni salad.    Added sugars. These are sugars added to foods. They are in foods such as ice cream, candy, soda, fruit drinks, sports drinks, energy drinks, cookies, pastries, jams, and syrups. Cut down on added sugars by sharing sweet treats with a family member or friend. You can also choose fruit for dessert, and drink water or other unsweetened beverages.     StayWell last reviewed this educational  content on 6/1/2020 2000-2021 [employer]. All rights reserved. This information is not intended as a substitute for professional medical care. Always follow your healthcare professional's instructions.          Signs of Hearing Loss      Hearing much better with one ear can be a sign of hearing loss.   Hearing loss is a problem shared by many people. In fact, it is one of the most common health problems, particularly as people age. Most people age 65 and older have some hearing loss. By age 80, almost everyone does. Hearing loss often occurs slowly over the years. So you may not realize your hearing has gotten worse.  Have your hearing checked  Call your healthcare provider if you:    Have to strain to hear normal conversation    Have to watch other people s faces very carefully to follow what they re saying    Need to ask people to repeat what they ve said    Often misunderstand what people are saying    Turn the volume of the television or radio up so high that others complain    Feel that people are mumbling when they re talking to you    Find that the effort to hear leaves you feeling tired and irritated    Notice, when using the phone, that you hear better with one ear than the other  StayWell last reviewed this educational content on 1/1/2020 2000-2021 [employer]. All rights reserved. This information is not intended as a substitute for professional medical care. Always follow your healthcare professional's instructions.

## 2022-01-25 NOTE — PROGRESS NOTES
"SUBJECTIVE:   Foster Enriquez is a 86 year old male who presents for Preventive Visit.      Patient has been advised of split billing requirements and indicates understanding: Yes  Are you in the first 12 months of your Medicare coverage?  No    Healthy Habits:     In general, how would you rate your overall health?  Excellent    Frequency of exercise:  1 day/week    Duration of exercise:  30-45 minutes    Do you usually eat at least 4 servings of fruit and vegetables a day, include whole grains    & fiber and avoid regularly eating high fat or \"junk\" foods?  No    Taking medications regularly:  Yes    Medication side effects:  None    Ability to successfully perform activities of daily living:  No assistance needed    Home Safety:  No safety concerns identified    Hearing Impairment:  Need to ask people to speak up or repeat themselves    In the past 6 months, have you been bothered by leaking of urine?  No    In general, how would you rate your overall mental or emotional health?  Good      PHQ-2 Total Score: 0    Additional concerns today:  No      Hearing Ambient noise difficult     Urine [facility]   9 oz in bladder Avodart / Cherry Juice For bladder   Self Cath intermittent     Do you feel safe in your environment? Yes    Have you ever done Advance Care Planning? : No, advance care planning information given to patient to review.  Patient declined advance care planning discussion at this time.    Has hearing aid   Fall risk  Fallen 2 or more times in the past year?: No  Any fall with injury in the past year?: No  click delete button to remove this line now  Cognitive Screening   1) Repeat 3 items (Leader, Season, Table)    2) Clock draw: NORMAL  3) 3 item recall: Recalls 2 objects   Results: NORMAL clock, 1-2 items recalled: COGNITIVE IMPAIRMENT LESS LIKELY    Mini-CogTM Copyright TYESHA Aguilar. Licensed by the author for use in [facility]; reprinted with permission (jose [email or web address]). All rights " "reserved.      Reviewed and updated as needed this visit by clinical staff  Tobacco  Allergies  Meds             Reviewed and updated as needed this visit by Provider               Social History     Tobacco Use     Smoking status: Former Smoker     Smokeless tobacco: Never Used   Substance Use Topics     Alcohol use: Yes     Comment: 3-4 drinks per year including beer     If you drink alcohol do you typically have >3 drinks per day or >7 drinks per week? No    Alcohol Use 1/25/2022   Prescreen: >3 drinks/day or >7 drinks/week? No   Prescreen: >3 drinks/day or >7 drinks/week? -           Current providers sharing in care for this patient include:  Patient Care Team:  Fan Contreras MD as PCP - General (Family Practice)  Fan Contreras MD as Assigned PCP  [facility]   Eye Doctor     The following health maintenance items are reviewed in Epic and correct as of today:  Health Maintenance Due   Topic Date Due     ANNUAL REVIEW OF HM ORDERS  Never done     ZOSTER IMMUNIZATION (2 of 3) 03/01/2013     DTAP/TDAP/TD IMMUNIZATION (2 - Td or Tdap) 07/01/2018     FALL RISK ASSESSMENT  07/20/2021     Vaccine + Booster         Review of Systems  Urine retention 7 oz   Hearing Loss    OBJECTIVE:   BP (!) 140/58   Pulse 77   Ht 1.753 m (5' 9\")   Wt 92.6 kg (204 lb 1 oz)   SpO2 95%   BMI 30.13 kg/m   Estimated body mass index is 30.13 kg/m  as calculated from the following:    Height as of this encounter: 1.753 m (5' 9\").    Weight as of this encounter: 92.6 kg (204 lb 1 oz).  Physical Exam      Physical:  General Appearance: Healthy-appearingy.   Head:  No deformity  Eyes: Sclerae white, pupils equal and reactive, extra ocular movements intact   Left eye cataract removed poor vision  Right eye moderate cataract  Ears: Well-positioned, well-formed pinnae; TM pearly white, translucent, no bulging   Nose: Clear, normal mucosa no drainage or crusting  Throat: Lips, tongue, and mucosa are moist, pink and intact; " "tongue no thrush oral pharynx no injection or lesions  + coated tongue old  Neck: Supple, symmetric ROM no nodes   No carotid Buits  Chest: Lungs clear to auscultation, no retractions  Heart: Regular rate & rhythm, S1 S2, no murmur  Abdomen: Soft, non-tender, no masses; umbilical area normal old scar lower right abdomen  Pulses: Equal femoral pulses  : No hernia palpable   Extremities: Well-perfused, warm and dry, No Edema  Palpable Pulses Bilateral  Neuro: Easily aroused good tone NO tremor   Skin  No Rash excoriation upper right back  Areas of skin damage forehead  Hemangiomas back              ASSESSMENT / PLAN:   Foster was seen today for physical and gastrointestinal problem.    Diagnoses and all orders for this visit:    Vitamin B12 deficiency  -     Vitamin B12; Future    Vitamin D deficiency  -     Vitamin D Deficiency; Future    Dyslipidemia  -     Lipid Profile (Chol, Trig, HDL, LDL calc); Future    Benign non-nodular prostatic hyperplasia with lower urinary tract symptoms    Essential hypertension  -     Comprehensive metabolic panel (BMP + Alb, Alk Phos, ALT, AST, Total. Bili, TP); Future  -     CBC with platelets; Future  -     TSH with free T4 reflex; Future    Screening for prostate cancer  -     PSA, screen; Future    Blurred vision        Patient has been advised of split billing requirements and indicates understanding: No    COUNSELING:  Reviewed preventive health counseling, as reflected in patient instructions       Regular exercise       Healthy diet/nutrition    Estimated body mass index is 30.13 kg/m  as calculated from the following:    Height as of this encounter: 1.753 m (5' 9\").    Weight as of this encounter: 92.6 kg (204 lb 1 oz).    Weight management plan: Discussed healthy diet and exercise guidelines    He reports that he has quit smoking. He has never used smokeless tobacco.      Appropriate preventive services were discussed with this patient, including applicable screening as " appropriate for cardiovascular disease, diabetes, osteopenia/osteoporosis, and glaucoma.  As appropriate for age/gender, discussed screening for colorectal cancer, prostate cancer, breast cancer, and cervical cancer. Checklist reviewing preventive services available has been given to the patient.    Reviewed patients plan of care and provided an AVS. The Basic Care Plan (routine screening as documented in Health Maintenance) for Foster meets the Care Plan requirement. This Care Plan has been established and reviewed with the Patient.    Counseling Resources:  ATP IV Guidelines  Pooled Cohorts Equation Calculator  Breast Cancer Risk Calculator  Breast Cancer: Medication to Reduce Risk  FRAX Risk Assessment  ICSI Preventive Guidelines  Dietary Guidelines for Americans, 2010  [employer]'s MyPlate  ASA Prophylaxis  Lung CA Screening    Fan Contreras MD  [facility]    Identified Health Risks:    He is at risk for lack of exercise and has been provided with information to increase physical activity for the benefit of his well-being.  The patient was counseled and encouraged to consider modifying their diet and eating habits. He was provided with information on recommended healthy diet options.  The patient was provided with written information regarding signs of hearing loss.

## 2022-01-26 LAB — DEPRECATED CALCIDIOL+CALCIFEROL SERPL-MC: 27 UG/L (ref 30–80)

## 2022-02-16 ENCOUNTER — TRANSFERRED RECORDS (OUTPATIENT)
Dept: HEALTH INFORMATION MANAGEMENT | Facility: CLINIC | Age: 87
End: 2022-02-16
Payer: COMMERCIAL

## 2022-03-10 ENCOUNTER — TRANSFERRED RECORDS (OUTPATIENT)
Dept: HEALTH INFORMATION MANAGEMENT | Facility: CLINIC | Age: 87
End: 2022-03-10
Payer: COMMERCIAL

## 2022-07-12 ENCOUNTER — TRANSFERRED RECORDS (OUTPATIENT)
Dept: FAMILY MEDICINE | Facility: CLINIC | Age: 87
End: 2022-07-12

## 2022-08-02 DIAGNOSIS — I10 HYPERTENSION, UNSPECIFIED TYPE: ICD-10-CM

## 2022-08-03 RX ORDER — AMLODIPINE BESYLATE 5 MG/1
TABLET ORAL
Qty: 90 TABLET | Refills: 1 | Status: SHIPPED | OUTPATIENT
Start: 2022-08-03

## 2022-08-03 NOTE — TELEPHONE ENCOUNTER
"Last Written Prescription Date:  8/23/21  Last Fill Quantity: 90,  # refills: 3   Last office visit provider:  1/25/22    Requested Prescriptions   Pending Prescriptions Disp Refills     amLODIPine (NORVASC) 5 MG tablet 90 tablet 3     Sig: [AMLODIPINE (NORVASC) 5 MG TABLET] TAKE 1 TABLET BY MOUTH DAILY       Calcium Channel Blockers Protocol  Failed - 8/2/2022  9:53 AM        Failed - Recent (12 mo) or future (30 days) visit within the authorizing provider's specialty     Patient has had an office visit with the authorizing provider or a provider within the authorizing providers department within the previous 12 mos or has a future within next 30 days. See \"Patient Info\" tab in inbasket, or \"Choose Columns\" in Meds & Orders section of the refill encounter.              Passed - Blood pressure under 140/90 in past 12 months     BP Readings from Last 3 Encounters:   01/25/22 116/52   07/20/20 124/60                 Passed - Medication is active on med list        Passed - Patient is age 18 or older        Passed - Normal serum creatinine on file in past 12 months     Recent Labs   Lab Test 01/25/22  0913   CR 1.14       Ok to refill medication if creatinine is low               Sandee Da Silva RN 08/03/22 1:39 PM  "

## 2022-09-08 ENCOUNTER — TRANSFERRED RECORDS (OUTPATIENT)
Dept: HEALTH INFORMATION MANAGEMENT | Facility: CLINIC | Age: 87
End: 2022-09-08

## 2022-09-25 ENCOUNTER — HEALTH MAINTENANCE LETTER (OUTPATIENT)
Age: 87
End: 2022-09-25

## 2023-03-02 ENCOUNTER — LAB REQUISITION (OUTPATIENT)
Dept: LAB | Facility: CLINIC | Age: 88
End: 2023-03-02
Payer: COMMERCIAL

## 2023-03-02 DIAGNOSIS — Z12.5 ENCOUNTER FOR SCREENING FOR MALIGNANT NEOPLASM OF PROSTATE: ICD-10-CM

## 2023-03-02 DIAGNOSIS — I10 ESSENTIAL (PRIMARY) HYPERTENSION: ICD-10-CM

## 2023-03-02 DIAGNOSIS — Z13.220 ENCOUNTER FOR SCREENING FOR LIPOID DISORDERS: ICD-10-CM

## 2023-03-02 DIAGNOSIS — E53.8 DEFICIENCY OF OTHER SPECIFIED B GROUP VITAMINS: ICD-10-CM

## 2023-03-02 LAB
ALBUMIN SERPL BCG-MCNC: 4.3 G/DL (ref 3.5–5.2)
ALP SERPL-CCNC: 69 U/L (ref 40–129)
ALT SERPL W P-5'-P-CCNC: 41 U/L (ref 10–50)
ANION GAP SERPL CALCULATED.3IONS-SCNC: 15 MMOL/L (ref 7–15)
AST SERPL W P-5'-P-CCNC: 51 U/L (ref 10–50)
BILIRUB SERPL-MCNC: 1.1 MG/DL
BUN SERPL-MCNC: 14.9 MG/DL (ref 8–23)
CALCIUM SERPL-MCNC: 9.2 MG/DL (ref 8.8–10.2)
CHLORIDE SERPL-SCNC: 102 MMOL/L (ref 98–107)
CHOLEST SERPL-MCNC: 220 MG/DL
CREAT SERPL-MCNC: 1.04 MG/DL (ref 0.67–1.17)
DEPRECATED HCO3 PLAS-SCNC: 23 MMOL/L (ref 22–29)
GFR SERPL CREATININE-BSD FRML MDRD: 70 ML/MIN/1.73M2
GLUCOSE SERPL-MCNC: 99 MG/DL (ref 70–99)
HDLC SERPL-MCNC: 50 MG/DL
LDLC SERPL CALC-MCNC: 142 MG/DL
NONHDLC SERPL-MCNC: 170 MG/DL
POTASSIUM SERPL-SCNC: 4.2 MMOL/L (ref 3.4–5.3)
PROT SERPL-MCNC: 7 G/DL (ref 6.4–8.3)
PSA SERPL-MCNC: 0.42 NG/ML
SODIUM SERPL-SCNC: 140 MMOL/L (ref 136–145)
TRIGL SERPL-MCNC: 142 MG/DL
TSH SERPL DL<=0.005 MIU/L-ACNC: 2.48 UIU/ML (ref 0.3–4.2)
VIT B12 SERPL-MCNC: 1282 PG/ML (ref 232–1245)

## 2023-03-02 PROCEDURE — 80061 LIPID PANEL: CPT | Mod: ORL | Performed by: FAMILY MEDICINE

## 2023-03-02 PROCEDURE — 80053 COMPREHEN METABOLIC PANEL: CPT | Mod: ORL | Performed by: FAMILY MEDICINE

## 2023-03-02 PROCEDURE — 82306 VITAMIN D 25 HYDROXY: CPT | Mod: ORL | Performed by: FAMILY MEDICINE

## 2023-03-02 PROCEDURE — G0103 PSA SCREENING: HCPCS | Mod: ORL | Performed by: FAMILY MEDICINE

## 2023-03-02 PROCEDURE — 84443 ASSAY THYROID STIM HORMONE: CPT | Mod: ORL | Performed by: FAMILY MEDICINE

## 2023-03-02 PROCEDURE — 82607 VITAMIN B-12: CPT | Mod: ORL | Performed by: FAMILY MEDICINE

## 2023-03-04 LAB — DEPRECATED CALCIDIOL+CALCIFEROL SERPL-MC: 19 UG/L (ref 20–75)

## 2023-05-13 ENCOUNTER — HEALTH MAINTENANCE LETTER (OUTPATIENT)
Age: 88
End: 2023-05-13

## 2024-03-04 ENCOUNTER — LAB REQUISITION (OUTPATIENT)
Dept: LAB | Facility: CLINIC | Age: 89
End: 2024-03-04
Payer: COMMERCIAL

## 2024-03-04 DIAGNOSIS — I10 ESSENTIAL (PRIMARY) HYPERTENSION: ICD-10-CM

## 2024-03-04 DIAGNOSIS — E55.9 VITAMIN D DEFICIENCY, UNSPECIFIED: ICD-10-CM

## 2024-03-04 LAB
ALBUMIN SERPL BCG-MCNC: 4.4 G/DL (ref 3.5–5.2)
ALP SERPL-CCNC: 70 U/L (ref 40–150)
ALT SERPL W P-5'-P-CCNC: 40 U/L (ref 0–70)
ANION GAP SERPL CALCULATED.3IONS-SCNC: 12 MMOL/L (ref 7–15)
AST SERPL W P-5'-P-CCNC: 51 U/L (ref 0–45)
BILIRUB SERPL-MCNC: 1.3 MG/DL
BUN SERPL-MCNC: 16.9 MG/DL (ref 8–23)
CALCIUM SERPL-MCNC: 9 MG/DL (ref 8.8–10.2)
CHLORIDE SERPL-SCNC: 102 MMOL/L (ref 98–107)
CREAT SERPL-MCNC: 1.05 MG/DL (ref 0.67–1.17)
DEPRECATED HCO3 PLAS-SCNC: 25 MMOL/L (ref 22–29)
EGFRCR SERPLBLD CKD-EPI 2021: 69 ML/MIN/1.73M2
GLUCOSE SERPL-MCNC: 114 MG/DL (ref 70–99)
POTASSIUM SERPL-SCNC: 4 MMOL/L (ref 3.4–5.3)
PROT SERPL-MCNC: 7.3 G/DL (ref 6.4–8.3)
SODIUM SERPL-SCNC: 139 MMOL/L (ref 135–145)
TSH SERPL DL<=0.005 MIU/L-ACNC: 2.81 UIU/ML (ref 0.3–4.2)
VIT D+METAB SERPL-MCNC: 26 NG/ML (ref 20–50)

## 2024-03-04 PROCEDURE — 84443 ASSAY THYROID STIM HORMONE: CPT | Mod: ORL | Performed by: FAMILY MEDICINE

## 2024-03-04 PROCEDURE — 80053 COMPREHEN METABOLIC PANEL: CPT | Mod: ORL | Performed by: FAMILY MEDICINE

## 2024-03-04 PROCEDURE — 82306 VITAMIN D 25 HYDROXY: CPT | Mod: ORL | Performed by: FAMILY MEDICINE

## 2024-03-21 ENCOUNTER — TRANSFERRED RECORDS (OUTPATIENT)
Dept: HEALTH INFORMATION MANAGEMENT | Facility: CLINIC | Age: 89
End: 2024-03-21
Payer: COMMERCIAL

## 2024-07-20 ENCOUNTER — HEALTH MAINTENANCE LETTER (OUTPATIENT)
Age: 89
End: 2024-07-20

## 2025-02-24 ENCOUNTER — TRANSFERRED RECORDS (OUTPATIENT)
Dept: HEALTH INFORMATION MANAGEMENT | Facility: CLINIC | Age: 89
End: 2025-02-24
Payer: COMMERCIAL

## 2025-03-24 ENCOUNTER — LAB REQUISITION (OUTPATIENT)
Dept: LAB | Facility: CLINIC | Age: 89
End: 2025-03-24
Payer: COMMERCIAL

## 2025-03-24 DIAGNOSIS — Z12.5 ENCOUNTER FOR SCREENING FOR MALIGNANT NEOPLASM OF PROSTATE: ICD-10-CM

## 2025-03-24 PROCEDURE — G0103 PSA SCREENING: HCPCS | Mod: ORL | Performed by: FAMILY MEDICINE

## 2025-03-25 LAB — PSA SERPL DL<=0.01 NG/ML-MCNC: 0.64 NG/ML

## 2025-04-18 ENCOUNTER — LAB REQUISITION (OUTPATIENT)
Dept: LAB | Facility: CLINIC | Age: 89
End: 2025-04-18
Payer: COMMERCIAL

## 2025-04-18 DIAGNOSIS — E78.5 HYPERLIPIDEMIA, UNSPECIFIED: ICD-10-CM

## 2025-04-18 PROCEDURE — 80061 LIPID PANEL: CPT | Mod: ORL | Performed by: FAMILY MEDICINE

## 2025-04-18 PROCEDURE — 80053 COMPREHEN METABOLIC PANEL: CPT | Mod: ORL | Performed by: FAMILY MEDICINE

## 2025-04-18 PROCEDURE — 82172 ASSAY OF APOLIPOPROTEIN: CPT | Mod: ORL | Performed by: FAMILY MEDICINE

## 2025-04-19 LAB
ALBUMIN SERPL BCG-MCNC: 4.1 G/DL (ref 3.5–5.2)
ALP SERPL-CCNC: 78 U/L (ref 40–150)
ALT SERPL W P-5'-P-CCNC: 40 U/L (ref 0–70)
ANION GAP SERPL CALCULATED.3IONS-SCNC: 13 MMOL/L (ref 7–15)
AST SERPL W P-5'-P-CCNC: 47 U/L (ref 0–45)
BILIRUB SERPL-MCNC: 0.6 MG/DL
BUN SERPL-MCNC: 19 MG/DL (ref 8–23)
CALCIUM SERPL-MCNC: 9.1 MG/DL (ref 8.8–10.4)
CHLORIDE SERPL-SCNC: 103 MMOL/L (ref 98–107)
CHOLEST SERPL-MCNC: 171 MG/DL
CREAT SERPL-MCNC: 0.99 MG/DL (ref 0.67–1.17)
EGFRCR SERPLBLD CKD-EPI 2021: 74 ML/MIN/1.73M2
FASTING STATUS PATIENT QL REPORTED: NO
FASTING STATUS PATIENT QL REPORTED: NO
GLUCOSE SERPL-MCNC: 98 MG/DL (ref 70–99)
HCO3 SERPL-SCNC: 23 MMOL/L (ref 22–29)
HDLC SERPL-MCNC: 49 MG/DL
LDLC SERPL CALC-MCNC: 96 MG/DL
NONHDLC SERPL-MCNC: 122 MG/DL
POTASSIUM SERPL-SCNC: 4.2 MMOL/L (ref 3.4–5.3)
PROT SERPL-MCNC: 7.1 G/DL (ref 6.4–8.3)
SODIUM SERPL-SCNC: 139 MMOL/L (ref 135–145)
TRIGL SERPL-MCNC: 128 MG/DL

## 2025-04-21 LAB — APO B100 SERPL-MCNC: 85 MG/DL

## 2025-05-05 ENCOUNTER — LAB REQUISITION (OUTPATIENT)
Dept: LAB | Facility: CLINIC | Age: 89
End: 2025-05-05
Payer: COMMERCIAL

## 2025-05-05 DIAGNOSIS — R33.9 RETENTION OF URINE, UNSPECIFIED: ICD-10-CM

## 2025-05-05 PROCEDURE — 87186 SC STD MICRODIL/AGAR DIL: CPT | Mod: ORL | Performed by: FAMILY MEDICINE

## 2025-05-08 LAB
BACTERIA UR CULT: ABNORMAL
BACTERIA UR CULT: ABNORMAL

## 2025-05-29 ENCOUNTER — TRANSFERRED RECORDS (OUTPATIENT)
Dept: HEALTH INFORMATION MANAGEMENT | Facility: CLINIC | Age: 89
End: 2025-05-29
Payer: COMMERCIAL

## 2025-08-11 ENCOUNTER — LAB REQUISITION (OUTPATIENT)
Dept: LAB | Facility: CLINIC | Age: 89
End: 2025-08-11
Payer: COMMERCIAL

## 2025-08-11 DIAGNOSIS — R42 DIZZINESS AND GIDDINESS: ICD-10-CM

## 2025-08-11 LAB
ALBUMIN SERPL BCG-MCNC: 4.3 G/DL (ref 3.5–5.2)
ALP SERPL-CCNC: 72 U/L (ref 40–150)
ALT SERPL W P-5'-P-CCNC: 34 U/L (ref 0–70)
ANION GAP SERPL CALCULATED.3IONS-SCNC: 13 MMOL/L (ref 7–15)
AST SERPL W P-5'-P-CCNC: 47 U/L (ref 0–45)
BILIRUB SERPL-MCNC: 1.1 MG/DL
BUN SERPL-MCNC: 19.6 MG/DL (ref 8–23)
CALCIUM SERPL-MCNC: 9.2 MG/DL (ref 8.8–10.4)
CHLORIDE SERPL-SCNC: 102 MMOL/L (ref 98–107)
CREAT SERPL-MCNC: 1.13 MG/DL (ref 0.67–1.17)
EGFRCR SERPLBLD CKD-EPI 2021: 63 ML/MIN/1.73M2
GLUCOSE SERPL-MCNC: 93 MG/DL (ref 70–99)
HCO3 SERPL-SCNC: 24 MMOL/L (ref 22–29)
POTASSIUM SERPL-SCNC: 4 MMOL/L (ref 3.4–5.3)
PROT SERPL-MCNC: 7.2 G/DL (ref 6.4–8.3)
SODIUM SERPL-SCNC: 139 MMOL/L (ref 135–145)
TSH SERPL DL<=0.005 MIU/L-ACNC: 3.37 UIU/ML (ref 0.3–4.2)